# Patient Record
Sex: FEMALE | Race: WHITE | Employment: FULL TIME | URBAN - METROPOLITAN AREA
[De-identification: names, ages, dates, MRNs, and addresses within clinical notes are randomized per-mention and may not be internally consistent; named-entity substitution may affect disease eponyms.]

---

## 2017-01-13 ENCOUNTER — OFFICE VISIT (OUTPATIENT)
Dept: INTERNAL MEDICINE CLINIC | Facility: CLINIC | Age: 27
End: 2017-01-13

## 2017-01-13 VITALS
SYSTOLIC BLOOD PRESSURE: 110 MMHG | HEART RATE: 87 BPM | RESPIRATION RATE: 14 BRPM | WEIGHT: 127 LBS | OXYGEN SATURATION: 99 % | BODY MASS INDEX: 22.5 KG/M2 | TEMPERATURE: 99 F | DIASTOLIC BLOOD PRESSURE: 70 MMHG | HEIGHT: 62.9 IN

## 2017-01-13 DIAGNOSIS — R68.83 CHILLS: ICD-10-CM

## 2017-01-13 DIAGNOSIS — J02.9 SORE THROAT: Primary | ICD-10-CM

## 2017-01-13 LAB
CONTROL LINE PRESENT WITH A CLEAR BACKGROUND (YES/NO): PRESENT YES/NO
KIT EXPIRATION DATE: NORMAL DATE
STREP GRP A CUL-SCR: NEGATIVE

## 2017-01-13 PROCEDURE — 99213 OFFICE O/P EST LOW 20 MIN: CPT | Performed by: INTERNAL MEDICINE

## 2017-01-13 PROCEDURE — 87880 STREP A ASSAY W/OPTIC: CPT | Performed by: INTERNAL MEDICINE

## 2017-01-13 RX ORDER — AMOXICILLIN 500 MG/1
500 CAPSULE ORAL 3 TIMES DAILY
Qty: 30 CAPSULE | Refills: 0 | Status: SHIPPED | OUTPATIENT
Start: 2017-01-13 | End: 2017-01-23

## 2017-01-13 NOTE — PROGRESS NOTES
Sukhwinder Dimas is a 31year old female.     Chief complaint: sore throat and chills    HPI:     31year old female with PMH as listed below here for sore throat and chills  Sore throat started 1 week   No runny nose no sob no chest pain   No ear isreal tenderness  EXTREMITIES: no cyanosis, clubbing or edema  NEURO: no gross deficits         No orders of the defined types were placed in this encounter. Orders Placed This Encounter  POC Rapid Strep [[id number]]    ASSESSMENT AND PLAN:     1.  Sore throat

## 2017-02-02 ENCOUNTER — HOSPITAL ENCOUNTER (OUTPATIENT)
Age: 27
Discharge: HOME | End: 2017-02-02
Payer: COMMERCIAL

## 2017-02-02 VITALS
RESPIRATION RATE: 18 BRPM | BODY MASS INDEX: 22.15 KG/M2 | WEIGHT: 125 LBS | SYSTOLIC BLOOD PRESSURE: 112 MMHG | DIASTOLIC BLOOD PRESSURE: 65 MMHG | HEART RATE: 82 BPM | TEMPERATURE: 98 F | HEIGHT: 63 IN

## 2017-02-02 DIAGNOSIS — H61.22 IMPACTED CERUMEN OF LEFT EAR: ICD-10-CM

## 2017-02-02 DIAGNOSIS — N30.00 ACUTE CYSTITIS WITHOUT HEMATURIA: Primary | ICD-10-CM

## 2017-02-02 LAB
B-HCG UR QL: NEGATIVE
URINE BILIRUBIN: NEGATIVE
URINE BLOOD: NEGATIVE
URINE GLUCOSE: NEGATIVE MG/DL
URINE KETONES: NEGATIVE MG/DL
URINE NITRITE: NEGATIVE
URINE PH: 6
URINE SPECIFIC GRAVITY: 1.02
URINE UROBILINOGEN: 0.2 MG/DL

## 2017-02-02 PROCEDURE — 81002 URINALYSIS NONAUTO W/O SCOPE: CPT

## 2017-02-02 PROCEDURE — 99204 OFFICE O/P NEW MOD 45 MIN: CPT

## 2017-02-02 PROCEDURE — 81025 URINE PREGNANCY TEST: CPT

## 2017-02-02 PROCEDURE — 69209 REMOVE IMPACTED EAR WAX UNI: CPT

## 2017-02-02 PROCEDURE — 99213 OFFICE O/P EST LOW 20 MIN: CPT

## 2017-02-02 RX ORDER — NITROFURANTOIN 25; 75 MG/1; MG/1
100 CAPSULE ORAL 2 TIMES DAILY
Qty: 14 CAPSULE | Refills: 0 | Status: SHIPPED | OUTPATIENT
Start: 2017-02-02 | End: 2017-02-09

## 2017-02-03 NOTE — ED PROVIDER NOTES
Patient presents with:  Urinary Symptoms (urologic)      HPI:     Gayatri Monet is a 31year old female who presents with a chief complaint of urinary frequency and urgency that started a few days ago.   The patient states she has some left lower quad Date)  GENERAL: well developed, well nourished, well hydrated, no distress  SKIN: good skin turgor, no rashes  HEENT:normocephalic, Right TM clear without signs of infection. Left TM-cerumen present. Minimal visualization of the TM.  No mastoid redness, isreal follow-up. She is aware that if her left lower quadrant pain does not get better or becomes worse, she should go to the emergency department for further evaluation. The left ear was irrigated. The cerumen was removed.   Post removal, the ear was visualiz

## 2017-02-21 ENCOUNTER — OFFICE VISIT (OUTPATIENT)
Dept: INTERNAL MEDICINE CLINIC | Facility: CLINIC | Age: 27
End: 2017-02-21

## 2017-02-21 VITALS
SYSTOLIC BLOOD PRESSURE: 110 MMHG | RESPIRATION RATE: 19 BRPM | HEIGHT: 62.9 IN | OXYGEN SATURATION: 97 % | HEART RATE: 69 BPM | BODY MASS INDEX: 22.5 KG/M2 | DIASTOLIC BLOOD PRESSURE: 60 MMHG | WEIGHT: 127 LBS | TEMPERATURE: 98 F

## 2017-02-21 DIAGNOSIS — E55.9 VITAMIN D DEFICIENCY: ICD-10-CM

## 2017-02-21 DIAGNOSIS — R35.0 URINARY FREQUENCY: Primary | ICD-10-CM

## 2017-02-21 LAB
APPEARANCE: CLEAR
BILIRUBIN: NEGATIVE
GLUCOSE (URINE DIPSTICK): NEGATIVE MG/DL
KETONES (URINE DIPSTICK): NEGATIVE MG/DL
LEUKOCYTES: NEGATIVE
MULTISTIX LOT#: NORMAL NUMERIC
NITRITE, URINE: NEGATIVE
PH, URINE: 5.5 (ref 4.5–8)
PROTEIN (URINE DIPSTICK): NEGATIVE MG/DL
SPECIFIC GRAVITY: 1.03 (ref 1–1.03)
URINE-COLOR: YELLOW
UROBILINOGEN,SEMI-QN: 0.2 MG/DL (ref 0–1.9)

## 2017-02-21 PROCEDURE — 99213 OFFICE O/P EST LOW 20 MIN: CPT | Performed by: FAMILY MEDICINE

## 2017-02-21 PROCEDURE — 81003 URINALYSIS AUTO W/O SCOPE: CPT | Performed by: FAMILY MEDICINE

## 2017-02-21 RX ORDER — NITROFURANTOIN 25; 75 MG/1; MG/1
CAPSULE ORAL
Refills: 0 | COMMUNITY
Start: 2017-02-02 | End: 2017-12-29 | Stop reason: ALTCHOICE

## 2017-02-21 RX ORDER — AMOXICILLIN 500 MG/1
CAPSULE ORAL
Refills: 0 | COMMUNITY
Start: 2017-01-13 | End: 2017-02-21 | Stop reason: ALTCHOICE

## 2017-02-21 RX ORDER — ERGOCALCIFEROL 1.25 MG/1
50000 CAPSULE ORAL WEEKLY
Qty: 12 CAPSULE | Refills: 0 | Status: SHIPPED | OUTPATIENT
Start: 2017-02-21 | End: 2017-03-23

## 2017-02-21 RX ORDER — ERGOCALCIFEROL 1.25 MG/1
CAPSULE ORAL
Refills: 3 | COMMUNITY
Start: 2016-12-30 | End: 2017-12-29 | Stop reason: ALTCHOICE

## 2017-02-22 NOTE — PROGRESS NOTES
CC:  Lab Results and UTI      Hx of CC:  URINARY FREQUENCY OVER PAST SEVERAL DAYS, NO DYSURIA. HAD MENSES AROUND 2/15. IMPROVED ENERGY POST VITAMIN D X 1 MONTH BUT RECURRENT FATIGUE.     Vitals:    02/21/17  1823   BP: 110/60   Pulse: 69   Temp: 98 °F (

## 2017-12-22 ENCOUNTER — APPOINTMENT (OUTPATIENT)
Dept: GENERAL RADIOLOGY | Age: 27
End: 2017-12-22
Attending: EMERGENCY MEDICINE
Payer: COMMERCIAL

## 2017-12-22 ENCOUNTER — HOSPITAL ENCOUNTER (OUTPATIENT)
Age: 27
Discharge: HOME | End: 2017-12-22
Attending: EMERGENCY MEDICINE
Payer: COMMERCIAL

## 2017-12-22 VITALS
WEIGHT: 135 LBS | TEMPERATURE: 99 F | HEIGHT: 62 IN | OXYGEN SATURATION: 100 % | BODY MASS INDEX: 24.84 KG/M2 | HEART RATE: 72 BPM | DIASTOLIC BLOOD PRESSURE: 72 MMHG | RESPIRATION RATE: 18 BRPM | SYSTOLIC BLOOD PRESSURE: 115 MMHG

## 2017-12-22 DIAGNOSIS — R07.9 ACUTE CHEST PAIN: Primary | ICD-10-CM

## 2017-12-22 PROCEDURE — 99213 OFFICE O/P EST LOW 20 MIN: CPT

## 2017-12-22 PROCEDURE — 71101 X-RAY EXAM UNILAT RIBS/CHEST: CPT | Performed by: EMERGENCY MEDICINE

## 2017-12-22 NOTE — ED INITIAL ASSESSMENT (HPI)
r sided rib pain on and off for 2 weeks, + sob, denies leg pain or swelling, denies trauma or injury

## 2017-12-22 NOTE — ED PROVIDER NOTES
Patient Seen in: [facility]    History   Patient presents with:  Trauma (cardiovascular, musculoskeletal)    Stated Complaint: RT. SIDE RIBS PAIN    HPI    This patient complains of right sided pain in her chest for the l (Oral)   Resp 18   Ht 157.5 cm (5' 2\")   Wt 61.2 kg   LMP 12/01/2017 (Exact Date)   SpO2 100%   BMI 24.69 kg/m²         Physical Exam    Patient is awake and alert nontoxic in appearance  Eyes pupils are equal and reactive  ENT there are moist mucous memb symptoms do not improve

## 2017-12-29 ENCOUNTER — OFFICE VISIT (OUTPATIENT)
Dept: INTERNAL MEDICINE CLINIC | Facility: CLINIC | Age: 27
End: 2017-12-29

## 2017-12-29 VITALS
HEART RATE: 89 BPM | DIASTOLIC BLOOD PRESSURE: 62 MMHG | RESPIRATION RATE: 17 BRPM | BODY MASS INDEX: 27 KG/M2 | WEIGHT: 150 LBS | OXYGEN SATURATION: 100 % | SYSTOLIC BLOOD PRESSURE: 102 MMHG | TEMPERATURE: 98 F

## 2017-12-29 DIAGNOSIS — R07.81 RIB PAIN ON LEFT SIDE: Primary | ICD-10-CM

## 2017-12-29 PROCEDURE — 81000 URINALYSIS NONAUTO W/SCOPE: CPT | Performed by: INTERNAL MEDICINE

## 2017-12-29 PROCEDURE — 81025 URINE PREGNANCY TEST: CPT | Performed by: INTERNAL MEDICINE

## 2017-12-29 PROCEDURE — 99213 OFFICE O/P EST LOW 20 MIN: CPT | Performed by: INTERNAL MEDICINE

## 2017-12-29 RX ORDER — ARIPIPRAZOLE 2 MG/1
2 TABLET ORAL DAILY
COMMUNITY
End: 2018-06-06 | Stop reason: ALTCHOICE

## 2017-12-29 RX ORDER — RANITIDINE 150 MG/1
150 TABLET ORAL 2 TIMES DAILY
Qty: 40 TABLET | Refills: 0 | Status: SHIPPED | OUTPATIENT
Start: 2017-12-29 | End: 2018-06-06 | Stop reason: ALTCHOICE

## 2017-12-29 NOTE — PROGRESS NOTES
HPI:    Patient ID: Ranjit Grvaes is a 28year old female. HPI   Her for f/u after visit to . Patient had right lower rib discomfort for 3 weeks. She described it as if there are \" bubbles\"  around the area. Pain is 2/10.  Denies cough, fev no guarding. Musculoskeletal: She exhibits no edema. Skin: No rash noted. No erythema.      UCG negative  UA dipstick neg       ASSESSMENT/PLAN:   Rib pain on left side  (primary encounter diagnosis)    Rib x ray showed no fracture  BODØ is a teache

## 2018-06-06 ENCOUNTER — OFFICE VISIT (OUTPATIENT)
Dept: INTERNAL MEDICINE CLINIC | Facility: CLINIC | Age: 28
End: 2018-06-06

## 2018-06-06 VITALS
TEMPERATURE: 100 F | WEIGHT: 150.38 LBS | HEART RATE: 65 BPM | DIASTOLIC BLOOD PRESSURE: 60 MMHG | OXYGEN SATURATION: 99 % | HEIGHT: 62.9 IN | BODY MASS INDEX: 26.64 KG/M2 | SYSTOLIC BLOOD PRESSURE: 104 MMHG | RESPIRATION RATE: 17 BRPM

## 2018-06-06 DIAGNOSIS — L65.9 HAIR LOSS: ICD-10-CM

## 2018-06-06 DIAGNOSIS — R10.2 PELVIC PAIN: Primary | ICD-10-CM

## 2018-06-06 DIAGNOSIS — N92.0 MENORRHAGIA WITH REGULAR CYCLE: ICD-10-CM

## 2018-06-06 PROCEDURE — 99213 OFFICE O/P EST LOW 20 MIN: CPT | Performed by: INTERNAL MEDICINE

## 2018-06-06 RX ORDER — NORGESTIMATE AND ETHINYL ESTRADIOL 0.25-0.035
1 KIT ORAL DAILY
Qty: 1 PACKAGE | Refills: 11 | Status: SHIPPED | OUTPATIENT
Start: 2018-06-06 | End: 2019-02-13

## 2018-06-06 NOTE — PROGRESS NOTES
HPI:    Patient ID: Cj Goodwin is a 33year old female. Patient here to address ongoing issues of left lower quadrant paion heightened by the time of menses- had a normal gyne exam ands pap smear a few weeks ago.    2yrs ago had a pelvic US whic

## 2018-07-22 ENCOUNTER — HOSPITAL ENCOUNTER (OUTPATIENT)
Age: 28
Discharge: HOME | End: 2018-07-22
Payer: COMMERCIAL

## 2018-07-22 VITALS
WEIGHT: 150 LBS | SYSTOLIC BLOOD PRESSURE: 124 MMHG | TEMPERATURE: 98 F | HEIGHT: 63 IN | BODY MASS INDEX: 26.58 KG/M2 | DIASTOLIC BLOOD PRESSURE: 80 MMHG | HEART RATE: 74 BPM | RESPIRATION RATE: 18 BRPM | OXYGEN SATURATION: 100 %

## 2018-07-22 DIAGNOSIS — N30.90 CYSTITIS: Primary | ICD-10-CM

## 2018-07-22 LAB
B-HCG UR QL: NEGATIVE
BILIRUB UR QL STRIP: NEGATIVE
CLARITY UR: CLEAR
COLOR UR: YELLOW
GLUCOSE UR STRIP-MCNC: NEGATIVE MG/DL
HGB UR QL STRIP: NEGATIVE
KETONES UR STRIP-MCNC: NEGATIVE MG/DL
NITRITE UR QL STRIP: NEGATIVE
PH UR STRIP: 5.5 [PH]
PROT UR STRIP-MCNC: NEGATIVE MG/DL
SP GR UR STRIP: 1.02
UROBILINOGEN UR STRIP-ACNC: <2 MG/DL

## 2018-07-22 PROCEDURE — 81003 URINALYSIS AUTO W/O SCOPE: CPT

## 2018-07-22 PROCEDURE — 99213 OFFICE O/P EST LOW 20 MIN: CPT

## 2018-07-22 PROCEDURE — 99214 OFFICE O/P EST MOD 30 MIN: CPT

## 2018-07-22 PROCEDURE — 81025 URINE PREGNANCY TEST: CPT

## 2018-07-22 RX ORDER — NITROFURANTOIN 25; 75 MG/1; MG/1
100 CAPSULE ORAL 2 TIMES DAILY
Qty: 14 CAPSULE | Refills: 0 | Status: SHIPPED | OUTPATIENT
Start: 2018-07-22 | End: 2018-07-29

## 2018-07-22 NOTE — ED INITIAL ASSESSMENT (HPI)
Patient reports 1 week history of urinary frequency and dysuria.  + chills over the \"last few days. \"  Denies flank/lower back pain.

## 2018-07-22 NOTE — ED PROVIDER NOTES
Patient presents with:  Urinary Symptoms (urologic)      HPI:     Gene Wen is a 33year old female who presents with a chief complaint of dysuria, urgency, and frequency for the past couple days.   She has had a urinary tract infection in the pas RRW  CARDIO: RRR without murmur  EXTREMITIES: no cyanosis or edema. PAREKH without difficulty  BACK: CVA tenderness: Bilaterally, No  GI: soft, non-tender, without masses or organomegaly  NEURO: No deficits.       MDM/Assessment/Plan:   Orders for this encount

## 2018-07-23 ENCOUNTER — APPOINTMENT (OUTPATIENT)
Dept: GENERAL RADIOLOGY | Facility: HOSPITAL | Age: 28
End: 2018-07-23
Attending: EMERGENCY MEDICINE
Payer: COMMERCIAL

## 2018-07-23 ENCOUNTER — HOSPITAL ENCOUNTER (EMERGENCY)
Facility: HOSPITAL | Age: 28
Discharge: HOME | End: 2018-07-24
Attending: EMERGENCY MEDICINE
Payer: COMMERCIAL

## 2018-07-23 VITALS
SYSTOLIC BLOOD PRESSURE: 129 MMHG | DIASTOLIC BLOOD PRESSURE: 84 MMHG | BODY MASS INDEX: 27 KG/M2 | HEART RATE: 103 BPM | RESPIRATION RATE: 18 BRPM | TEMPERATURE: 99 F | OXYGEN SATURATION: 99 % | WEIGHT: 149.94 LBS

## 2018-07-23 DIAGNOSIS — T50.905A ADVERSE EFFECT OF DRUG, INITIAL ENCOUNTER: Primary | ICD-10-CM

## 2018-07-23 LAB
ANION GAP SERPL CALC-SCNC: 8 MMOL/L (ref 0–18)
B-HCG UR QL: NEGATIVE
BASOPHILS # BLD: 0 K/UL (ref 0–0.2)
BASOPHILS NFR BLD: 0 %
BILIRUB UR QL: NEGATIVE
BUN SERPL-MCNC: 12 MG/DL (ref 8–20)
BUN/CREAT SERPL: 15.2 (ref 10–20)
CALCIUM SERPL-MCNC: 8.9 MG/DL (ref 8.5–10.5)
CHLORIDE SERPL-SCNC: 104 MMOL/L (ref 95–110)
CO2 SERPL-SCNC: 23 MMOL/L (ref 22–32)
COLOR UR: YELLOW
CREAT SERPL-MCNC: 0.79 MG/DL (ref 0.5–1.5)
EOSINOPHIL # BLD: 0.4 K/UL (ref 0–0.7)
EOSINOPHIL NFR BLD: 3 %
ERYTHROCYTE [DISTWIDTH] IN BLOOD BY AUTOMATED COUNT: 12.8 % (ref 11–15)
GLUCOSE SERPL-MCNC: 102 MG/DL (ref 70–99)
GLUCOSE UR-MCNC: NEGATIVE MG/DL
HCT VFR BLD AUTO: 41.8 % (ref 35–48)
HGB BLD-MCNC: 14 G/DL (ref 12–16)
HGB UR QL STRIP.AUTO: NEGATIVE
KETONES UR-MCNC: NEGATIVE MG/DL
LYMPHOCYTES # BLD: 0.9 K/UL (ref 1–4)
LYMPHOCYTES NFR BLD: 7 %
MCH RBC QN AUTO: 29.5 PG (ref 27–32)
MCHC RBC AUTO-ENTMCNC: 33.5 G/DL (ref 32–37)
MCV RBC AUTO: 88 FL (ref 80–100)
MONOCYTES # BLD: 0.6 K/UL (ref 0–1)
MONOCYTES NFR BLD: 5 %
NEUTROPHILS # BLD AUTO: 10.5 K/UL (ref 1.8–7.7)
NEUTROPHILS NFR BLD: 85 %
NITRITE UR QL STRIP.AUTO: NEGATIVE
OSMOLALITY UR CALC.SUM OF ELEC: 280 MOSM/KG (ref 275–295)
PH UR: 5 [PH] (ref 5–8)
PLATELET # BLD AUTO: 224 K/UL (ref 140–400)
PMV BLD AUTO: 6.3 FL (ref 7.4–10.3)
POTASSIUM SERPL-SCNC: 3.5 MMOL/L (ref 3.3–5.1)
PROT UR-MCNC: NEGATIVE MG/DL
RBC # BLD AUTO: 4.75 M/UL (ref 3.7–5.4)
RBC #/AREA URNS AUTO: 2 /HPF
SODIUM SERPL-SCNC: 135 MMOL/L (ref 136–144)
SP GR UR STRIP: 1.02 (ref 1–1.03)
UROBILINOGEN UR STRIP-ACNC: <2
VIT C UR-MCNC: NEGATIVE MG/DL
WBC # BLD AUTO: 12.4 K/UL (ref 4–11)
WBC #/AREA URNS AUTO: 3 /HPF

## 2018-07-23 PROCEDURE — 82550 ASSAY OF CK (CPK): CPT | Performed by: EMERGENCY MEDICINE

## 2018-07-23 PROCEDURE — 36415 COLL VENOUS BLD VENIPUNCTURE: CPT

## 2018-07-23 PROCEDURE — 85025 COMPLETE CBC W/AUTO DIFF WBC: CPT | Performed by: EMERGENCY MEDICINE

## 2018-07-23 PROCEDURE — 99284 EMERGENCY DEPT VISIT MOD MDM: CPT

## 2018-07-23 PROCEDURE — 81001 URINALYSIS AUTO W/SCOPE: CPT | Performed by: EMERGENCY MEDICINE

## 2018-07-23 PROCEDURE — 80048 BASIC METABOLIC PNL TOTAL CA: CPT | Performed by: EMERGENCY MEDICINE

## 2018-07-23 PROCEDURE — 81025 URINE PREGNANCY TEST: CPT

## 2018-07-23 PROCEDURE — 71046 X-RAY EXAM CHEST 2 VIEWS: CPT | Performed by: EMERGENCY MEDICINE

## 2018-07-23 RX ORDER — CIPROFLOXACIN 250 MG/1
250 TABLET, FILM COATED ORAL 2 TIMES DAILY
Qty: 10 TABLET | Refills: 0 | Status: SHIPPED | OUTPATIENT
Start: 2018-07-23 | End: 2018-07-30 | Stop reason: ALTCHOICE

## 2018-07-24 LAB — CK SERPL-CCNC: 38 U/L (ref 38–234)

## 2018-07-24 NOTE — ED PROVIDER NOTES
Patient Seen in: [facility] AND [facility] Emergency Department    History   Patient presents with:  Leg Pain  Shortness Of Breath  Cough/URI    Stated Complaint: shortness of breath     HPI  Patient is a 23-year-old female who was seen in immediate care yesterd soft, nontender, no distension  Back: No cvat  Extremities: FROM of all extremities, no cyanosis/clubbing/edema, compartments soft  Neuro: CN intact, normal speech, normal gait, 5/5 motor strength in all extremities, no focal deficits  SKIN: warm, dry, no of UTI although urine with only trace LE. Will send for culture and advised continuing antibiotic with close f/u with PCP. Pt inquiring as to if she has vitamin deficiency causing her leg pains.  Advised her that test is not run from ED and if this is morales

## 2018-07-24 NOTE — ED INITIAL ASSESSMENT (HPI)
Dry cough began today and feeling SOB. Pt also c/o left thigh pain and body aches. Pt started on Macrobid but changed to [address] today by PCP.

## 2018-07-30 ENCOUNTER — OFFICE VISIT (OUTPATIENT)
Dept: INTERNAL MEDICINE CLINIC | Facility: CLINIC | Age: 28
End: 2018-07-30
Payer: COMMERCIAL

## 2018-07-30 VITALS
BODY MASS INDEX: 26.58 KG/M2 | HEIGHT: 62.9 IN | SYSTOLIC BLOOD PRESSURE: 110 MMHG | TEMPERATURE: 99 F | DIASTOLIC BLOOD PRESSURE: 60 MMHG | WEIGHT: 150 LBS | OXYGEN SATURATION: 98 % | RESPIRATION RATE: 16 BRPM | HEART RATE: 74 BPM

## 2018-07-30 DIAGNOSIS — R10.32 INTERMITTENT LEFT LOWER QUADRANT ABDOMINAL PAIN: Primary | ICD-10-CM

## 2018-07-30 PROCEDURE — 99213 OFFICE O/P EST LOW 20 MIN: CPT | Performed by: INTERNAL MEDICINE

## 2018-07-30 NOTE — PROGRESS NOTES
HPI:    Patient ID: Ever Boles is a 33year old female. Pt here for fu from ER evaluation for left lower quadrant pain with some radiation to the anterior thigh. Had a recent UTI rxed with Cipro.    Pain is enough to warrant continous  ADvil rx

## 2018-08-01 ENCOUNTER — HOSPITAL ENCOUNTER (OUTPATIENT)
Dept: ULTRASOUND IMAGING | Age: 28
Discharge: HOME | End: 2018-08-01
Attending: INTERNAL MEDICINE
Payer: COMMERCIAL

## 2018-08-01 DIAGNOSIS — R10.32 INTERMITTENT LEFT LOWER QUADRANT ABDOMINAL PAIN: ICD-10-CM

## 2018-08-01 PROCEDURE — 76830 TRANSVAGINAL US NON-OB: CPT | Performed by: INTERNAL MEDICINE

## 2018-08-01 PROCEDURE — 76856 US EXAM PELVIC COMPLETE: CPT | Performed by: INTERNAL MEDICINE

## 2018-08-02 ENCOUNTER — TELEPHONE (OUTPATIENT)
Dept: INTERNAL MEDICINE CLINIC | Facility: CLINIC | Age: 28
End: 2018-08-02

## 2018-08-02 NOTE — TELEPHONE ENCOUNTER
Pt believes she pulled a hamstring muscle stated that she was given muscle relaxer in the past. Pt denied any SOB, chest pain or dizziness. Tried to schedule pt to come in to office for evaluation. She refused.

## 2018-09-14 ENCOUNTER — TELEPHONE (OUTPATIENT)
Dept: INTERNAL MEDICINE CLINIC | Facility: CLINIC | Age: 28
End: 2018-09-14

## 2018-09-14 RX ORDER — DICYCLOMINE HYDROCHLORIDE 10 MG/1
10 CAPSULE ORAL 4 TIMES DAILY
Qty: 20 CAPSULE | Refills: 1 | Status: SHIPPED | OUTPATIENT
Start: 2018-09-14 | End: 2018-09-24

## 2018-09-14 NOTE — TELEPHONE ENCOUNTER
Pt wanted to be seen today for stomach pain, informed her Dr Rachel Martin is booked today.  Pt insists to have Dr. Rachel Martin call her told her we are not sure what time she would be available to call her since she is seen pts, also informed her she would need to go

## 2018-09-17 ENCOUNTER — TELEPHONE (OUTPATIENT)
Dept: GASTROENTEROLOGY | Facility: CLINIC | Age: 28
End: 2018-09-17

## 2018-09-17 NOTE — TELEPHONE ENCOUNTER
Dr Timbo Ramsey is wondering if you can see this pt ASAP. She is experiencing a lot of abdominal pain.      See telephone encounter from 09/14/18 and pt Wirescan messages to Dr Timbo Ramsey    Please advise if you can see this pt or if Thelma Dotson can see her      thanks

## 2018-09-17 NOTE — TELEPHONE ENCOUNTER
I can see tomorrow morning at the [facility], sometime before 11 AM.  Please notify patient as to location.

## 2018-09-17 NOTE — TELEPHONE ENCOUNTER
Patient called back I made appt for her tomorrow as Dr. Ana Laura Dale requested for 9/18/18 @ 10AM  Future Appointments   Date Time Provider Lyndsey Michelle   9/18/2018 10:00 AM MD Gerardo [address] - [place]

## 2018-09-18 ENCOUNTER — OFFICE VISIT (OUTPATIENT)
Dept: GASTROENTEROLOGY | Facility: CLINIC | Age: 28
End: 2018-09-18
Payer: COMMERCIAL

## 2018-09-18 ENCOUNTER — TELEPHONE (OUTPATIENT)
Dept: GASTROENTEROLOGY | Facility: CLINIC | Age: 28
End: 2018-09-18

## 2018-09-18 VITALS
WEIGHT: 154 LBS | DIASTOLIC BLOOD PRESSURE: 74 MMHG | HEART RATE: 75 BPM | BODY MASS INDEX: 26.95 KG/M2 | HEIGHT: 63.5 IN | SYSTOLIC BLOOD PRESSURE: 109 MMHG

## 2018-09-18 DIAGNOSIS — R10.32 INTERMITTENT LEFT LOWER QUADRANT ABDOMINAL PAIN: Primary | ICD-10-CM

## 2018-09-18 DIAGNOSIS — K58.1 IRRITABLE BOWEL SYNDROME WITH CONSTIPATION: ICD-10-CM

## 2018-09-18 PROCEDURE — 99212 OFFICE O/P EST SF 10 MIN: CPT | Performed by: INTERNAL MEDICINE

## 2018-09-18 PROCEDURE — 99244 OFF/OP CNSLTJ NEW/EST MOD 40: CPT | Performed by: INTERNAL MEDICINE

## 2018-09-18 NOTE — TELEPHONE ENCOUNTER
Scheduled for:  Colonoscopy 46742  Provider Name:   Date:  10/19/18  Location:  [facility]  Sedation: Ivcs   Time: 0830 / Arrival 0730   Prep: Split dose Miralax +Gatorade + 2 tabs of Dulcolax   Meds/Allergies Reconciled?:  Physician reviewed  Diagnosis wit

## 2018-09-18 NOTE — PATIENT INSTRUCTIONS
1.  Schedule CT scan of the abdomen and pelvis with IV and oral contrast    2. Schedule colonoscopy with split dose MiraLAX preparation and MAC at [facility] or IV sedation at [facility] to be timed after CT scan. 3.  Continue dicyclomine as needed.

## 2018-09-18 NOTE — PROGRESS NOTES
HPI:    Patient ID: New Chiang is a 33year old female. History of Present Illness: This is a 30-year-old female patient of Dr. Nabila Corona referred for abdominal pain.   The patient describes intermittent left lower quadrant pain which occurs in record    Current medications: Dicyclomine as needed, and improved with this somewhat, sertraline, birth control pills not taking    Social history: Non-smoker, rare alcohol, some caffeine.   Independent in her activities of daily living    Family history: congestion, ear pain, sore throat and trouble swallowing. Eyes: Negative for pain. Respiratory: Negative for cough, choking, chest tightness, shortness of breath, wheezing and stridor.     Cardiovascular: Negative for chest pain, palpitations and leg s discharge. No scleral icterus. Neck: Normal range of motion. Neck supple. No JVD present. No tracheal deviation present. No thyromegaly present. Cardiovascular: Normal rate, regular rhythm and normal heart sounds. No murmur heard.   Pulmonary/Chest: E

## 2018-09-25 ENCOUNTER — TELEPHONE (OUTPATIENT)
Dept: GASTROENTEROLOGY | Facility: CLINIC | Age: 28
End: 2018-09-25

## 2018-09-25 DIAGNOSIS — R10.32 LEFT LOWER QUADRANT PAIN: Primary | ICD-10-CM

## 2018-09-25 NOTE — TELEPHONE ENCOUNTER
I spoke to the pt. She was advised that Dr Jacqueline Haro ordered the CT scan. She needs to wait to here from our managed care department that the procedure has been approved before scheduling.  I gave her the number for central scheduling for when she is ready to sc

## 2018-09-25 NOTE — TELEPHONE ENCOUNTER
Dr Zuleika Pineda,    The pt has a colonoscopy scheduled at the end of November. Do you want to order a CT scan for prior to the procedure? Please advise      Instructions     1. Schedule CT scan of the abdomen and pelvis with IV and oral contrast     2.   David

## 2018-09-25 NOTE — TELEPHONE ENCOUNTER
Yes CT scan  abdomen and pelvis with IV and oral contrast should be done. I have ordered it. Thank you.

## 2018-09-25 NOTE — TELEPHONE ENCOUNTER
Pt states that Dr. Marquis Guzman wanted her to have CT done before she has colonoscopy. Has this been ordered and also authorized? Please call.

## 2018-10-03 ENCOUNTER — HOSPITAL ENCOUNTER (OUTPATIENT)
Dept: CT IMAGING | Facility: HOSPITAL | Age: 28
Discharge: HOME | End: 2018-10-03
Attending: INTERNAL MEDICINE
Payer: COMMERCIAL

## 2018-10-03 DIAGNOSIS — R10.32 LEFT LOWER QUADRANT PAIN: ICD-10-CM

## 2018-10-03 PROCEDURE — 74177 CT ABD & PELVIS W/CONTRAST: CPT | Performed by: INTERNAL MEDICINE

## 2018-10-03 PROCEDURE — 82565 ASSAY OF CREATININE: CPT

## 2018-10-09 ENCOUNTER — TELEPHONE (OUTPATIENT)
Dept: GASTROENTEROLOGY | Facility: CLINIC | Age: 28
End: 2018-10-09

## 2018-10-09 DIAGNOSIS — R10.32 INTERMITTENT LEFT LOWER QUADRANT ABDOMINAL PAIN: Primary | ICD-10-CM

## 2018-10-09 NOTE — TELEPHONE ENCOUNTER
Pt calling for results from CT ABDOMEN+PELVIS completed on 10/03/18.  Please call thank you [phone]

## 2018-10-11 NOTE — TELEPHONE ENCOUNTER
Pt called again about results. Aware EMBS out of office but she states that she is scheduled for colonoscopy on 10/19/18 and would like to know if she still needs to have procedure based on  CT results. Please call.

## 2018-10-11 NOTE — TELEPHONE ENCOUNTER
I spoke with the patient. Her left lower quadrant abdominal pain is resolved on dicyclomine. Her constipation is also resolved with conservative management. I reviewed the CT scan with her. She wants to cancel the colonoscopy.   I agree that the [email or web address]

## 2018-12-09 ENCOUNTER — HOSPITAL ENCOUNTER (OUTPATIENT)
Age: 28
Discharge: HOME | End: 2018-12-09
Attending: EMERGENCY MEDICINE
Payer: COMMERCIAL

## 2018-12-09 VITALS
HEART RATE: 80 BPM | OXYGEN SATURATION: 100 % | SYSTOLIC BLOOD PRESSURE: 114 MMHG | TEMPERATURE: 98 F | BODY MASS INDEX: 24.8 KG/M2 | RESPIRATION RATE: 16 BRPM | WEIGHT: 140 LBS | DIASTOLIC BLOOD PRESSURE: 66 MMHG | HEIGHT: 63 IN

## 2018-12-09 DIAGNOSIS — B96.89 ACUTE BACTERIAL PHARYNGITIS: Primary | ICD-10-CM

## 2018-12-09 DIAGNOSIS — J02.8 ACUTE BACTERIAL PHARYNGITIS: Primary | ICD-10-CM

## 2018-12-09 PROCEDURE — 99214 OFFICE O/P EST MOD 30 MIN: CPT

## 2018-12-09 PROCEDURE — 87430 STREP A AG IA: CPT

## 2018-12-09 PROCEDURE — 99213 OFFICE O/P EST LOW 20 MIN: CPT

## 2018-12-09 RX ORDER — CEFDINIR 300 MG/1
300 CAPSULE ORAL 2 TIMES DAILY
Qty: 14 CAPSULE | Refills: 0 | Status: SHIPPED | OUTPATIENT
Start: 2018-12-09 | End: 2018-12-16

## 2018-12-09 NOTE — ED PROVIDER NOTES
Patient Seen in: [facility] In [place]      Stated Complaint: Sore throat    HPI    The patient complains of sore throat which she has had for 3 weeks. The patient denies fever or chills or cough. She has had right-sided ear pain. tenderness on palpation of the right submandibular area no overlying fluctuance or induration  Lungs are clear to auscultation  Extremities there is no edema   Neurologic there are no gross cranial nerve deficits patient moves all 4 extremities without imp

## 2018-12-09 NOTE — ED INITIAL ASSESSMENT (HPI)
REPORTS SORE THROAT X 3 WEEKS. DENIES Danis Banks. STATES SHE FEELS TIRED. C/O RIGHT SIDED EAR PAIN AS WELL.

## 2019-02-13 ENCOUNTER — OFFICE VISIT (OUTPATIENT)
Dept: INTERNAL MEDICINE CLINIC | Facility: CLINIC | Age: 29
End: 2019-02-13
Payer: COMMERCIAL

## 2019-02-13 VITALS
DIASTOLIC BLOOD PRESSURE: 88 MMHG | HEIGHT: 63 IN | WEIGHT: 159 LBS | BODY MASS INDEX: 28.17 KG/M2 | SYSTOLIC BLOOD PRESSURE: 118 MMHG

## 2019-02-13 DIAGNOSIS — R53.83 OTHER FATIGUE: Primary | ICD-10-CM

## 2019-02-13 DIAGNOSIS — G93.3 POST VIRAL SYNDROME: ICD-10-CM

## 2019-02-13 LAB
BASOPHILS # BLD AUTO: 0.05 X10(3) UL (ref 0–0.2)
BASOPHILS NFR BLD AUTO: 0.5 %
DEPRECATED RDW RBC AUTO: 39.5 FL (ref 35.1–46.3)
EOSINOPHIL # BLD AUTO: 0.25 X10(3) UL (ref 0–0.7)
EOSINOPHIL NFR BLD AUTO: 2.6 %
ERYTHROCYTE [DISTWIDTH] IN BLOOD BY AUTOMATED COUNT: 12.7 % (ref 11–15)
HCT VFR BLD AUTO: 40.3 % (ref 35–48)
HGB BLD-MCNC: 13.5 G/DL (ref 12–16)
IMM GRANULOCYTES # BLD AUTO: 0.03 X10(3) UL (ref 0–1)
IMM GRANULOCYTES NFR BLD: 0.3 %
LYMPHOCYTES # BLD AUTO: 2.74 X10(3) UL (ref 1–4)
LYMPHOCYTES NFR BLD AUTO: 28.8 %
MCH RBC QN AUTO: 29 PG (ref 26–34)
MCHC RBC AUTO-ENTMCNC: 33.5 G/DL (ref 31–37)
MCV RBC AUTO: 86.5 FL (ref 80–100)
MONOCYTES # BLD AUTO: 0.5 X10(3) UL (ref 0.1–1)
MONOCYTES NFR BLD AUTO: 5.3 %
NEUTROPHILS # BLD AUTO: 5.95 X10 (3) UL (ref 1.5–7.7)
NEUTROPHILS # BLD AUTO: 5.95 X10(3) UL (ref 1.5–7.7)
NEUTROPHILS NFR BLD AUTO: 62.5 %
PLATELET # BLD AUTO: 262 10(3)UL (ref 150–450)
RBC # BLD AUTO: 4.66 X10(6)UL (ref 3.8–5.3)
TSI SER-ACNC: 0.97 MIU/ML (ref 0.36–3.74)
VIT B12 SERPL-MCNC: 571 PG/ML (ref 193–986)
WBC # BLD AUTO: 9.5 X10(3) UL (ref 4–11)

## 2019-02-13 PROCEDURE — 99213 OFFICE O/P EST LOW 20 MIN: CPT | Performed by: INTERNAL MEDICINE

## 2019-02-13 PROCEDURE — 82306 VITAMIN D 25 HYDROXY: CPT | Performed by: INTERNAL MEDICINE

## 2019-02-13 PROCEDURE — 82607 VITAMIN B-12: CPT | Performed by: INTERNAL MEDICINE

## 2019-02-13 PROCEDURE — 84443 ASSAY THYROID STIM HORMONE: CPT | Performed by: INTERNAL MEDICINE

## 2019-02-13 PROCEDURE — 85025 COMPLETE CBC W/AUTO DIFF WBC: CPT | Performed by: INTERNAL MEDICINE

## 2019-02-13 NOTE — PROGRESS NOTES
HPI:    Patient ID: Jaylon Tran is a 33year old female. Pt here for an evaluation of some fatigue and low energy level. Has been to urgent care 2 x for what appeared to be a viral illness. 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 .    Review of

## 2019-02-15 LAB — 25(OH)D3 SERPL-MCNC: 14.1 NG/ML (ref 30–100)

## 2019-03-21 ENCOUNTER — WALK IN (OUTPATIENT)
Dept: URGENT CARE | Age: 29
End: 2019-03-21

## 2019-03-21 DIAGNOSIS — J30.2 SEASONAL ALLERGIC RHINITIS, UNSPECIFIED TRIGGER: ICD-10-CM

## 2019-03-21 DIAGNOSIS — J02.9 SORE THROAT: Primary | ICD-10-CM

## 2019-03-21 LAB
INTERNAL PROCEDURAL CONTROLS ACCEPTABLE: YES
S PYO AG THROAT QL IA.RAPID: NEGATIVE

## 2019-03-21 PROCEDURE — 87880 STREP A ASSAY W/OPTIC: CPT | Performed by: NURSE PRACTITIONER

## 2019-03-21 PROCEDURE — 99213 OFFICE O/P EST LOW 20 MIN: CPT | Performed by: NURSE PRACTITIONER

## 2019-03-21 RX ORDER — SERTRALINE HYDROCHLORIDE 100 MG/1
TABLET, FILM COATED ORAL 2 TIMES DAILY
COMMUNITY

## 2019-03-21 RX ORDER — ERGOCALCIFEROL 1.25 MG/1
50000 CAPSULE ORAL
COMMUNITY

## 2019-03-21 SDOH — HEALTH STABILITY: MENTAL HEALTH: HOW OFTEN DO YOU HAVE A DRINK CONTAINING ALCOHOL?: NEVER

## 2019-03-21 ASSESSMENT — ENCOUNTER SYMPTOMS
CONSTITUTIONAL NEGATIVE: 1
SORE THROAT: 1
ALLERGIC/IMMUNOLOGIC NEGATIVE: 1
COUGH: 1
NEUROLOGICAL NEGATIVE: 1
SWOLLEN GLANDS: 0

## 2019-03-21 ASSESSMENT — PAIN SCALES - GENERAL: PAINLEVEL: 3-4

## 2019-04-28 ENCOUNTER — HOSPITAL ENCOUNTER (OUTPATIENT)
Age: 29
Discharge: HOME | End: 2019-04-28
Attending: EMERGENCY MEDICINE
Payer: COMMERCIAL

## 2019-04-28 VITALS
RESPIRATION RATE: 20 BRPM | SYSTOLIC BLOOD PRESSURE: 128 MMHG | OXYGEN SATURATION: 99 % | BODY MASS INDEX: 26.58 KG/M2 | TEMPERATURE: 98 F | HEART RATE: 81 BPM | HEIGHT: 63 IN | WEIGHT: 150 LBS | DIASTOLIC BLOOD PRESSURE: 75 MMHG

## 2019-04-28 DIAGNOSIS — H01.001 BLEPHARITIS OF RIGHT UPPER EYELID, UNSPECIFIED TYPE: Primary | ICD-10-CM

## 2019-04-28 PROCEDURE — 99213 OFFICE O/P EST LOW 20 MIN: CPT

## 2019-04-28 PROCEDURE — 99214 OFFICE O/P EST MOD 30 MIN: CPT

## 2019-04-28 RX ORDER — CEPHALEXIN 500 MG/1
500 CAPSULE ORAL 3 TIMES DAILY
Qty: 21 CAPSULE | Refills: 0 | Status: SHIPPED | OUTPATIENT
Start: 2019-04-28 | End: 2019-05-05

## 2019-04-28 NOTE — ED PROVIDER NOTES
Patient Seen in: [facility]    History   Patient presents with:   Eye Visual Problem (opthalmic)    Stated Complaint: right eye problem     HPI    The patient is a 78-year-old female with history of high ISMAEL levels without Normocephalic atraumatic  Eyes: PERRLA, EOMI   conjunctiva noninjected, no exudate  Tender nodular density in the right upper eyelid  No visible chalazion  Diffuse erythema of the right upper eyelid to the crease  No involvement of the lower eyelid  Ears:

## 2019-04-28 NOTE — ED INITIAL ASSESSMENT (HPI)
Reports swelling to right upper eyelid x 5 days. Denies discharge from the eye. Usually wears contacts.

## 2019-07-08 ENCOUNTER — HOSPITAL ENCOUNTER (OUTPATIENT)
Age: 29
Discharge: ANOTHER HOSPITAL | End: 2019-07-08
Attending: EMERGENCY MEDICINE
Payer: COMMERCIAL

## 2019-07-08 ENCOUNTER — HOSPITAL ENCOUNTER (EMERGENCY)
Facility: HOSPITAL | Age: 29
Discharge: STILL ADMITTED OR ADMINISTRATIVE | End: 2019-07-09
Payer: COMMERCIAL

## 2019-07-08 VITALS
DIASTOLIC BLOOD PRESSURE: 77 MMHG | SYSTOLIC BLOOD PRESSURE: 121 MMHG | HEART RATE: 75 BPM | WEIGHT: 150 LBS | OXYGEN SATURATION: 100 % | HEIGHT: 63 IN | RESPIRATION RATE: 18 BRPM | TEMPERATURE: 98 F | BODY MASS INDEX: 26.58 KG/M2

## 2019-07-08 DIAGNOSIS — R10.32 LLQ PAIN: Primary | ICD-10-CM

## 2019-07-08 PROCEDURE — 99213 OFFICE O/P EST LOW 20 MIN: CPT

## 2019-07-08 PROCEDURE — 99212 OFFICE O/P EST SF 10 MIN: CPT

## 2019-07-08 NOTE — ED PROVIDER NOTES
Patient Seen in: [facility] In [address] [place] [address]    History   Patient presents with:  Abdomen/Flank Pain (GI/)    Stated Complaint: Left Abd Pain    HPI    Patient complains of llq abdominal pain that began 2 weeks ago and not getting any Temp 98.4 °F (36.9 °C)   Temp src Oral   SpO2 100 %   O2 Device None (Room air)       Current:/77   Pulse 75   Temp 98.4 °F (36.9 °C) (Oral)   Resp 18   Ht 160 cm (5' 3\")   Wt 68 kg   LMP 07/01/2019   SpO2 100%   BMI 26.57 kg/m²   Pulse ox

## 2019-07-08 NOTE — ED INITIAL ASSESSMENT (HPI)
Pt complaining of left lower quadrant abd pain for 2 weeks. No fever or diarrhea. Last emesis was 2 days ago. +nausea. Pt states she feels tired. Pt states hx of ovarian cysts and IBS. Pt tried Bentyl, but no relief.

## 2019-07-09 ENCOUNTER — OFFICE VISIT (OUTPATIENT)
Dept: INTERNAL MEDICINE CLINIC | Facility: CLINIC | Age: 29
End: 2019-07-09
Payer: COMMERCIAL

## 2019-07-09 VITALS
SYSTOLIC BLOOD PRESSURE: 114 MMHG | HEART RATE: 89 BPM | HEIGHT: 63 IN | BODY MASS INDEX: 27.46 KG/M2 | DIASTOLIC BLOOD PRESSURE: 70 MMHG | WEIGHT: 155 LBS | OXYGEN SATURATION: 99 %

## 2019-07-09 DIAGNOSIS — R10.32 INTERMITTENT LEFT LOWER QUADRANT ABDOMINAL PAIN: Primary | ICD-10-CM

## 2019-07-09 PROCEDURE — 99214 OFFICE O/P EST MOD 30 MIN: CPT | Performed by: INTERNAL MEDICINE

## 2019-07-09 RX ORDER — NAPROXEN 500 MG/1
500 TABLET ORAL 2 TIMES DAILY WITH MEALS
Qty: 20 TABLET | Refills: 1 | Status: SHIPPED | OUTPATIENT
Start: 2019-07-09 | End: 2019-07-09

## 2019-07-09 RX ORDER — NAPROXEN 500 MG/1
TABLET, DELAYED RELEASE ORAL
Qty: 180 TABLET | Refills: 1 | Status: SHIPPED | OUTPATIENT
Start: 2019-07-09 | End: 2020-02-10

## 2019-07-09 NOTE — PROGRESS NOTES
HPI:    Patient ID: Jaylon Tran is a 26year old female. Pt here for evaluation of ongoing left lower quadrant pain 4 out of 10 pain scale. Has known hx of IBS - bentyl has not helped.   Has hx of ovarian cysts in the past.  Would be open to BCP

## 2019-07-13 ENCOUNTER — HOSPITAL ENCOUNTER (EMERGENCY)
Facility: HOSPITAL | Age: 29
Discharge: HOME | End: 2019-07-13
Attending: EMERGENCY MEDICINE
Payer: COMMERCIAL

## 2019-07-13 ENCOUNTER — APPOINTMENT (OUTPATIENT)
Dept: ULTRASOUND IMAGING | Facility: HOSPITAL | Age: 29
End: 2019-07-13
Attending: EMERGENCY MEDICINE
Payer: COMMERCIAL

## 2019-07-13 ENCOUNTER — APPOINTMENT (OUTPATIENT)
Dept: CT IMAGING | Facility: HOSPITAL | Age: 29
End: 2019-07-13
Attending: EMERGENCY MEDICINE
Payer: COMMERCIAL

## 2019-07-13 VITALS
BODY MASS INDEX: 27.46 KG/M2 | TEMPERATURE: 98 F | DIASTOLIC BLOOD PRESSURE: 86 MMHG | WEIGHT: 155 LBS | RESPIRATION RATE: 18 BRPM | HEART RATE: 76 BPM | SYSTOLIC BLOOD PRESSURE: 120 MMHG | OXYGEN SATURATION: 99 % | HEIGHT: 63 IN

## 2019-07-13 DIAGNOSIS — R10.9 ABDOMINAL PAIN OF UNKNOWN ETIOLOGY: Primary | ICD-10-CM

## 2019-07-13 LAB
ANION GAP SERPL CALC-SCNC: 3 MMOL/L (ref 0–18)
B-HCG UR QL: NEGATIVE
BACTERIA UR QL AUTO: NEGATIVE /HPF
BASOPHILS # BLD AUTO: 0.06 X10(3) UL (ref 0–0.2)
BASOPHILS NFR BLD AUTO: 0.5 %
BILIRUB UR QL: NEGATIVE
BUN BLD-MCNC: 12 MG/DL (ref 7–18)
BUN/CREAT SERPL: 17.9 (ref 10–20)
CALCIUM BLD-MCNC: 8.6 MG/DL (ref 8.5–10.1)
CHLORIDE SERPL-SCNC: 111 MMOL/L (ref 98–112)
CLARITY UR: CLEAR
CO2 SERPL-SCNC: 26 MMOL/L (ref 21–32)
COLOR UR: YELLOW
CREAT BLD-MCNC: 0.67 MG/DL (ref 0.55–1.02)
DEPRECATED RDW RBC AUTO: 40 FL (ref 35.1–46.3)
EOSINOPHIL # BLD AUTO: 0.45 X10(3) UL (ref 0–0.7)
EOSINOPHIL NFR BLD AUTO: 3.4 %
ERYTHROCYTE [DISTWIDTH] IN BLOOD BY AUTOMATED COUNT: 12.6 % (ref 11–15)
GLUCOSE BLD-MCNC: 93 MG/DL (ref 70–99)
GLUCOSE UR-MCNC: NEGATIVE MG/DL
HCT VFR BLD AUTO: 40.5 % (ref 35–48)
HGB BLD-MCNC: 13.5 G/DL (ref 12–16)
HGB UR QL STRIP.AUTO: NEGATIVE
IMM GRANULOCYTES # BLD AUTO: 0.05 X10(3) UL (ref 0–1)
IMM GRANULOCYTES NFR BLD: 0.4 %
LYMPHOCYTES # BLD AUTO: 3.79 X10(3) UL (ref 1–4)
LYMPHOCYTES NFR BLD AUTO: 29 %
MCH RBC QN AUTO: 29.1 PG (ref 26–34)
MCHC RBC AUTO-ENTMCNC: 33.3 G/DL (ref 31–37)
MCV RBC AUTO: 87.3 FL (ref 80–100)
MONOCYTES # BLD AUTO: 0.61 X10(3) UL (ref 0.1–1)
MONOCYTES NFR BLD AUTO: 4.7 %
NEUTROPHILS # BLD AUTO: 8.11 X10 (3) UL (ref 1.5–7.7)
NEUTROPHILS # BLD AUTO: 8.11 X10(3) UL (ref 1.5–7.7)
NEUTROPHILS NFR BLD AUTO: 62 %
NITRITE UR QL STRIP.AUTO: NEGATIVE
OSMOLALITY SERPL CALC.SUM OF ELEC: 289 MOSM/KG (ref 275–295)
PH UR: 5 [PH] (ref 5–8)
PLATELET # BLD AUTO: 299 10(3)UL (ref 150–450)
POTASSIUM SERPL-SCNC: 3.6 MMOL/L (ref 3.5–5.1)
PROT UR-MCNC: NEGATIVE MG/DL
RBC # BLD AUTO: 4.64 X10(6)UL (ref 3.8–5.3)
RBC #/AREA URNS AUTO: 3 /HPF
SODIUM SERPL-SCNC: 140 MMOL/L (ref 136–145)
SP GR UR STRIP: 1.03 (ref 1–1.03)
UROBILINOGEN UR STRIP-ACNC: 2
VIT C UR-MCNC: 40 MG/DL
WBC # BLD AUTO: 13.1 X10(3) UL (ref 4–11)
WBC #/AREA URNS AUTO: 3 /HPF

## 2019-07-13 PROCEDURE — 85025 COMPLETE CBC W/AUTO DIFF WBC: CPT | Performed by: EMERGENCY MEDICINE

## 2019-07-13 PROCEDURE — 76830 TRANSVAGINAL US NON-OB: CPT | Performed by: EMERGENCY MEDICINE

## 2019-07-13 PROCEDURE — 93975 VASCULAR STUDY: CPT | Performed by: EMERGENCY MEDICINE

## 2019-07-13 PROCEDURE — 96374 THER/PROPH/DIAG INJ IV PUSH: CPT

## 2019-07-13 PROCEDURE — 80048 BASIC METABOLIC PNL TOTAL CA: CPT | Performed by: EMERGENCY MEDICINE

## 2019-07-13 PROCEDURE — 99285 EMERGENCY DEPT VISIT HI MDM: CPT

## 2019-07-13 PROCEDURE — 81001 URINALYSIS AUTO W/SCOPE: CPT | Performed by: EMERGENCY MEDICINE

## 2019-07-13 PROCEDURE — 96361 HYDRATE IV INFUSION ADD-ON: CPT

## 2019-07-13 PROCEDURE — 76856 US EXAM PELVIC COMPLETE: CPT | Performed by: EMERGENCY MEDICINE

## 2019-07-13 PROCEDURE — 81025 URINE PREGNANCY TEST: CPT

## 2019-07-13 PROCEDURE — 74177 CT ABD & PELVIS W/CONTRAST: CPT | Performed by: EMERGENCY MEDICINE

## 2019-07-13 RX ORDER — KETOROLAC TROMETHAMINE 15 MG/ML
15 INJECTION, SOLUTION INTRAMUSCULAR; INTRAVENOUS ONCE
Status: COMPLETED | OUTPATIENT
Start: 2019-07-13 | End: 2019-07-13

## 2019-07-13 NOTE — ED NOTES
Pt states \"I've felt the need to pee for a couple hours but can't seem to get any out. \"  Bladder scan shows 23cc of urine

## 2019-07-13 NOTE — ED NOTES
Pt rang call light stating that her IV was hurting her and that she wanted to leave AMA. Patient's IV flushed, remains in place at this time. MD notified of patient's desire to leave. AMA form printed. Primary RN notified.

## 2019-07-13 NOTE — ED NOTES
Discharge instructions reviewed. Pt verbalized understanding with no further questions. Pain controlled. Steady gait. Speaking in full clear sentences at discharge.

## 2019-07-13 NOTE — ED INITIAL ASSESSMENT (HPI)
LLQ abd pain for the last 1.5 weeks, hx of ovarian cysts. Seen at [address], and GP, US ordered, however pt reports no comfortable position tonight and unable to sleep.

## 2019-07-13 NOTE — ED PROVIDER NOTES
Patient Seen in: [facility] AND [facility] Emergency Department    History   Patient presents with:  Abdominal Pain    Stated Complaint: abdominal pain    HPI    27 yo female with 10 days of abdominal pain, seen by pmd and has ultrasound ordered as outpatient.  Carlos Alberto Lee and intact distal pulses. Pulmonary/Chest: Effort normal and breath sounds normal.   Abdominal: Soft. Bowel sounds are normal. She exhibits no distension and no mass. There is tenderness (left mid and left lower quadrants).  There is no rebound and no gua for left lower quadrant abdominal pain. Other: Lung bases clear. Heart size normal.  No biliary ductal dilatation. Bilateral symmetric nephrograms without hydronephrosis. No evidence of appendicitis. No pericolonic inflammation.   No free air or fr

## 2019-07-15 ENCOUNTER — TELEPHONE (OUTPATIENT)
Dept: INTERNAL MEDICINE CLINIC | Facility: CLINIC | Age: 29
End: 2019-07-15

## 2019-07-15 NOTE — TELEPHONE ENCOUNTER
Suggested pt make apt with gynecology- will try levsin for pain. Has a hemmorhagic cyst on Us of pelvis.

## 2019-07-15 NOTE — TELEPHONE ENCOUNTER
Still in much abdominal pain which ranges from 5 to 8. Was in ER over the weekend. US and CT done. Needs to understand results. What to do next??  Pain in right back just below ribs. Pain in left lower abdomen worse when laying down.

## 2019-08-05 ENCOUNTER — OFFICE VISIT (OUTPATIENT)
Dept: INTERNAL MEDICINE CLINIC | Facility: CLINIC | Age: 29
End: 2019-08-05
Payer: COMMERCIAL

## 2019-08-05 VITALS
HEIGHT: 63 IN | DIASTOLIC BLOOD PRESSURE: 74 MMHG | OXYGEN SATURATION: 99 % | TEMPERATURE: 99 F | SYSTOLIC BLOOD PRESSURE: 100 MMHG | HEART RATE: 84 BPM | WEIGHT: 151 LBS | BODY MASS INDEX: 26.75 KG/M2

## 2019-08-05 DIAGNOSIS — H60.311 ACUTE DIFFUSE OTITIS EXTERNA OF RIGHT EAR: Primary | ICD-10-CM

## 2019-08-05 PROCEDURE — 99213 OFFICE O/P EST LOW 20 MIN: CPT | Performed by: INTERNAL MEDICINE

## 2019-08-05 RX ORDER — NEOMYCIN SULFATE, POLYMYXIN B SULFATE, HYDROCORTISONE 3.5; 10000; 1 MG/ML; [USP'U]/ML; MG/ML
3 SOLUTION/ DROPS AURICULAR (OTIC) 3 TIMES DAILY
Qty: 1 BOTTLE | Refills: 0 | Status: SHIPPED | OUTPATIENT
Start: 2019-08-05 | End: 2020-03-03

## 2019-08-05 RX ORDER — NORETHINDRONE ACETATE AND ETHINYL ESTRADIOL AND FERROUS FUMARATE 1MG-20(24)
KIT ORAL
Refills: 3 | COMMUNITY
Start: 2019-07-18 | End: 2020-03-03

## 2019-08-05 NOTE — PROGRESS NOTES
HPI:    Patient ID: Neela Oliver is a 26year old female. Pt here for evalaution of right ear discomfort for about a week. No fevers or ear dc. No sore throat or associated symptoms. Has been swimming a bit with her son.     Review of Systems

## 2019-09-09 ENCOUNTER — PATIENT MESSAGE (OUTPATIENT)
Dept: INTERNAL MEDICINE CLINIC | Facility: CLINIC | Age: 29
End: 2019-09-09

## 2019-10-29 ENCOUNTER — OFFICE VISIT (OUTPATIENT)
Dept: INTERNAL MEDICINE CLINIC | Facility: CLINIC | Age: 29
End: 2019-10-29
Payer: COMMERCIAL

## 2019-10-29 VITALS
BODY MASS INDEX: 29.12 KG/M2 | OXYGEN SATURATION: 98 % | HEIGHT: 63 IN | WEIGHT: 164.38 LBS | HEART RATE: 72 BPM | DIASTOLIC BLOOD PRESSURE: 76 MMHG | SYSTOLIC BLOOD PRESSURE: 108 MMHG

## 2019-10-29 DIAGNOSIS — E55.9 VITAMIN D DEFICIENCY: ICD-10-CM

## 2019-10-29 DIAGNOSIS — L65.9 HAIR THINNING: Primary | ICD-10-CM

## 2019-10-29 PROCEDURE — 84443 ASSAY THYROID STIM HORMONE: CPT | Performed by: INTERNAL MEDICINE

## 2019-10-29 PROCEDURE — 99213 OFFICE O/P EST LOW 20 MIN: CPT | Performed by: INTERNAL MEDICINE

## 2019-10-29 PROCEDURE — 82306 VITAMIN D 25 HYDROXY: CPT | Performed by: INTERNAL MEDICINE

## 2019-10-29 NOTE — PROGRESS NOTES
HPI:    Patient ID: Luma Carranza is a 26year old female. HPI Pt here for fu on severe vit d def - treated aggressively with high dose vit d for several months.   Currently has noted some recurring hair loss and fatigue suggestive of recurrence of

## 2019-11-14 ENCOUNTER — HOSPITAL ENCOUNTER (OUTPATIENT)
Age: 29
Discharge: HOME | End: 2019-11-14
Payer: COMMERCIAL

## 2019-11-14 VITALS
BODY MASS INDEX: 24.8 KG/M2 | WEIGHT: 140 LBS | TEMPERATURE: 99 F | HEART RATE: 79 BPM | DIASTOLIC BLOOD PRESSURE: 70 MMHG | OXYGEN SATURATION: 97 % | HEIGHT: 63 IN | RESPIRATION RATE: 20 BRPM | SYSTOLIC BLOOD PRESSURE: 125 MMHG

## 2019-11-14 DIAGNOSIS — J01.90 ACUTE SINUSITIS, RECURRENCE NOT SPECIFIED, UNSPECIFIED LOCATION: Primary | ICD-10-CM

## 2019-11-14 PROCEDURE — 99214 OFFICE O/P EST MOD 30 MIN: CPT

## 2019-11-14 PROCEDURE — 99213 OFFICE O/P EST LOW 20 MIN: CPT

## 2019-11-14 RX ORDER — AMOXICILLIN AND CLAVULANATE POTASSIUM 875; 125 MG/1; MG/1
1 TABLET, FILM COATED ORAL 2 TIMES DAILY
Qty: 20 TABLET | Refills: 0 | Status: SHIPPED | OUTPATIENT
Start: 2019-11-14 | End: 2019-11-24

## 2019-11-14 NOTE — ED PROVIDER NOTES
Patient presents with:  Sinusitis      HPI:     Maryam Greenberg is a 26year old female who presents with a chief complaint of frontal and maxillary sinus congestion, thick purulent drainage from the nose, sinus discomfort, and a dry cough for the past on file      Food insecurity:        Worry: Not on file        Inability: Not on file      Transportation needs:        Medical: Not on file        Non-medical: Not on file    Tobacco Use      Smoking status: Never Smoker      Smokeless tobacco: Never Used clear  EARS: TM  bilateral: fluid present  NOSE: nasal turbinates: swollen and red  THROAT: redness noted, post nasal drip, uvula midline and airway patent  LUNGS: clear to auscultation bilaterally; no rales, rhonchi, or wheezes    MDM/Assessment/Plan:   O

## 2019-11-14 NOTE — ED INITIAL ASSESSMENT (HPI)
Reports 2 week hx of nasal congestion and frontal sinus pressure. Denies fevers. Reports mild throat discomfort as well.

## 2019-12-05 ENCOUNTER — TELEPHONE (OUTPATIENT)
Dept: INTERNAL MEDICINE CLINIC | Facility: CLINIC | Age: 29
End: 2019-12-05

## 2019-12-05 DIAGNOSIS — K52.9 SEVERE DIARRHEA: Primary | ICD-10-CM

## 2019-12-05 NOTE — TELEPHONE ENCOUNTER
Having diarrhea for several weeks. Was on Augmentin when this started. Foul smelling, 5-6 times daily, some cramping, mucous and some red blood.    Has not been taking anything for it; wanted to ask PCP first.    Educated patient on the possibility of h

## 2019-12-06 ENCOUNTER — TELEPHONE (OUTPATIENT)
Dept: INTERNAL MEDICINE CLINIC | Facility: CLINIC | Age: 29
End: 2019-12-06

## 2019-12-06 ENCOUNTER — NURSE ONLY (OUTPATIENT)
Dept: INTERNAL MEDICINE CLINIC | Facility: CLINIC | Age: 29
End: 2019-12-06
Payer: COMMERCIAL

## 2019-12-06 DIAGNOSIS — R19.7 DIARRHEA OF PRESUMED INFECTIOUS ORIGIN: ICD-10-CM

## 2019-12-06 PROCEDURE — 87493 C DIFF AMPLIFIED PROBE: CPT | Performed by: INTERNAL MEDICINE

## 2019-12-27 ENCOUNTER — TELEPHONE (OUTPATIENT)
Dept: INTERNAL MEDICINE CLINIC | Facility: CLINIC | Age: 29
End: 2019-12-27

## 2019-12-27 NOTE — TELEPHONE ENCOUNTER
Patient called asking if Dr. Landon Garduno could please give her a call. She did not mention why she needed the doctor to call her.

## 2020-02-10 RX ORDER — NAPROXEN 500 MG/1
TABLET, DELAYED RELEASE ORAL
Qty: 180 TABLET | Refills: 1 | Status: SHIPPED | OUTPATIENT
Start: 2020-02-10 | End: 2020-03-03

## 2020-02-10 NOTE — TELEPHONE ENCOUNTER
Requested Prescriptions     Pending Prescriptions Disp Refills   • EC-NAPROXEN 500 MG Oral Tab EC [Pharmacy Med Name: EC-NAPROXEN 500MG TABLETS] 180 tablet 1     Sig: TAKE 1 TABLET(500 MG) BY MOUTH TWICE DAILY WITH MEALS     Last office visit: 10-29-19  Me

## 2020-02-25 NOTE — PROGRESS NOTES
[address] [facility] [facility] Follow-up Visit    Ariela below  - No known issues with sedation.  - No known history of sleep apnea. Pertinent Family Hx:  + Colon polyps, paternal grandfather  - No family hx of esophageal, gastric or colon cancer  - No family history of IBD.     Prior endoscopies:  Denies    S vision  RESPIRATORY:  negative for  shortness of breath  CARDIOVASCULAR:  negative for  chest pain  GASTROINTESTINAL:  see HPI  GENITOURINARY:  negative for dysuria and hematuria   SKIN:  negative for  rash  ALLERGIC/IMMUNOLOGIC:  negative for hay fever al was diagnosed with C. difficile in December 2019. She has had multiple CT scans both during her initial consultation with Dr. Nel Shaffer and last summer during an ER visit. No acute findings for her pain were found. No history of diverticulitis.   Symptoms fel Tissue Transglutaminase Ab, IgA      Immunoglobulin A, Qn, Serum (IGA)      Meds This Visit:  Requested Prescriptions     Signed Prescriptions Disp Refills   • Dicyclomine HCl 10 MG Oral Cap 60 capsule 1     Sig: Take 1 capsule (10 mg total) by mouth 2 (

## 2020-03-03 ENCOUNTER — OFFICE VISIT (OUTPATIENT)
Dept: GASTROENTEROLOGY | Facility: CLINIC | Age: 30
End: 2020-03-03
Payer: COMMERCIAL

## 2020-03-03 VITALS
SYSTOLIC BLOOD PRESSURE: 133 MMHG | WEIGHT: 156 LBS | DIASTOLIC BLOOD PRESSURE: 85 MMHG | BODY MASS INDEX: 27.64 KG/M2 | HEART RATE: 83 BPM | HEIGHT: 63 IN

## 2020-03-03 DIAGNOSIS — R10.32 LLQ PAIN: ICD-10-CM

## 2020-03-03 DIAGNOSIS — R19.8 IRREGULAR BOWEL HABITS: Primary | ICD-10-CM

## 2020-03-03 DIAGNOSIS — Z86.19 HISTORY OF CLOSTRIDIOIDES DIFFICILE COLITIS: ICD-10-CM

## 2020-03-03 PROCEDURE — 99214 OFFICE O/P EST MOD 30 MIN: CPT | Performed by: NURSE PRACTITIONER

## 2020-03-03 RX ORDER — DICYCLOMINE HYDROCHLORIDE 10 MG/1
10 CAPSULE ORAL 2 TIMES DAILY PRN
Qty: 60 CAPSULE | Refills: 1 | Status: SHIPPED | OUTPATIENT
Start: 2020-03-03

## 2020-03-03 NOTE — PATIENT INSTRUCTIONS
-Complete celiac sprue testing   -Consider FODMAP diet (http://boone.com/), probiotics, fiber supplements, cutting out dairy/gluten  -Continue dicyclomine daily/as needed  -Consider IBgard  -Follow-up in 2-3 months

## 2020-03-10 ENCOUNTER — APPOINTMENT (OUTPATIENT)
Dept: LAB | Age: 30
End: 2020-03-10
Attending: NURSE PRACTITIONER
Payer: COMMERCIAL

## 2020-03-10 DIAGNOSIS — R19.8 IRREGULAR BOWEL HABITS: ICD-10-CM

## 2020-03-10 DIAGNOSIS — R10.32 LLQ PAIN: ICD-10-CM

## 2020-03-10 LAB — IGA SERPL-MCNC: 234 MG/DL (ref 70–312)

## 2020-03-10 PROCEDURE — 82784 ASSAY IGA/IGD/IGG/IGM EACH: CPT | Performed by: NURSE PRACTITIONER

## 2020-03-10 PROCEDURE — 83516 IMMUNOASSAY NONANTIBODY: CPT

## 2020-03-10 PROCEDURE — 36415 COLL VENOUS BLD VENIPUNCTURE: CPT

## 2020-03-11 LAB — TTG IGA SER-ACNC: 0.7 U/ML (ref ?–7)

## 2020-03-29 ENCOUNTER — E-VISIT (OUTPATIENT)
Dept: FAMILY MEDICINE CLINIC | Facility: CLINIC | Age: 30
End: 2020-03-29

## 2020-03-29 DIAGNOSIS — Z86.19 HISTORY OF CLOSTRIDIOIDES DIFFICILE COLITIS: ICD-10-CM

## 2020-03-29 DIAGNOSIS — J02.9 SORE THROAT: Primary | ICD-10-CM

## 2020-03-29 NOTE — PROGRESS NOTES
Advised to be evaluted in person at the [facility] for strep testing due to hx of c-diff will avoid empiric tx and to be tested to confirm strep throat. [address] and provider is agreeable to plan of care.

## 2020-03-30 ENCOUNTER — E-VISIT (OUTPATIENT)
Dept: FAMILY MEDICINE CLINIC | Facility: CLINIC | Age: 30
End: 2020-03-30

## 2020-03-30 DIAGNOSIS — J02.9 SORE THROAT: Primary | ICD-10-CM

## 2020-03-30 RX ORDER — AMOXICILLIN 500 MG/1
TABLET, FILM COATED ORAL
Qty: 21 TABLET | Refills: 0 | Status: SHIPPED | OUTPATIENT
Start: 2020-03-30 | End: 2020-06-10 | Stop reason: ALTCHOICE

## 2020-03-30 NOTE — TELEPHONE ENCOUNTER
Patient C/o soreness of throat with fever of 100 F. Has white  Spot behind her throat. H/o C dif on probiotics. Denies cough , body ache. A/P   Acute pharyngitis  Amoxicillin 500 mgs TID for 7 days  Take Probiotics. F/u with not improved.

## 2020-04-02 NOTE — PROGRESS NOTES
Regina Mac is a 26year old female. HPI:   See answers to questions above.      Current Outpatient Medications   Medication Sig Dispense Refill   • amoxicillin 500 MG Oral Tab 1 tab TID 21 tablet 0   • Dicyclomine HCl 10 MG Oral Cap Take 1 capsule

## 2020-06-10 ENCOUNTER — OFFICE VISIT (OUTPATIENT)
Dept: INTERNAL MEDICINE CLINIC | Facility: CLINIC | Age: 30
End: 2020-06-10
Payer: COMMERCIAL

## 2020-06-10 VITALS
WEIGHT: 153.38 LBS | RESPIRATION RATE: 16 BRPM | SYSTOLIC BLOOD PRESSURE: 104 MMHG | DIASTOLIC BLOOD PRESSURE: 70 MMHG | HEART RATE: 75 BPM | OXYGEN SATURATION: 98 % | BODY MASS INDEX: 27 KG/M2

## 2020-06-10 DIAGNOSIS — H68.003 EUSTACHIAN CATARRH, BILATERAL: Primary | ICD-10-CM

## 2020-06-10 PROCEDURE — 99213 OFFICE O/P EST LOW 20 MIN: CPT | Performed by: INTERNAL MEDICINE

## 2020-06-10 NOTE — PROGRESS NOTES
HPI:    Patient ID: Edmundo Ramirez is a 31year old female. HPIpt here for evaluation of bilaterally plugged ears for a few weeks. No pain or dc from the ears. No fever or chills. Review of Systems   Constitutional: Negative for fever.    HENT:

## 2020-06-25 ENCOUNTER — OFFICE VISIT (OUTPATIENT)
Dept: AUDIOLOGY | Facility: CLINIC | Age: 30
End: 2020-06-25
Payer: COMMERCIAL

## 2020-06-25 ENCOUNTER — OFFICE VISIT (OUTPATIENT)
Dept: OTOLARYNGOLOGY | Facility: CLINIC | Age: 30
End: 2020-06-25
Payer: COMMERCIAL

## 2020-06-25 VITALS — TEMPERATURE: 98 F | BODY MASS INDEX: 27.11 KG/M2 | WEIGHT: 153 LBS | HEIGHT: 63 IN

## 2020-06-25 DIAGNOSIS — H69.92 EUSTACHIAN TUBE DISORDER, LEFT: Primary | ICD-10-CM

## 2020-06-25 DIAGNOSIS — H91.90 HEARING LOSS, UNSPECIFIED HEARING LOSS TYPE, UNSPECIFIED LATERALITY: Primary | ICD-10-CM

## 2020-06-25 PROCEDURE — 99243 OFF/OP CNSLTJ NEW/EST LOW 30: CPT | Performed by: OTOLARYNGOLOGY

## 2020-06-25 PROCEDURE — 92557 COMPREHENSIVE HEARING TEST: CPT | Performed by: AUDIOLOGIST

## 2020-06-25 PROCEDURE — 92567 TYMPANOMETRY: CPT | Performed by: AUDIOLOGIST

## 2020-06-25 NOTE — PROGRESS NOTES
Ema Tello is a 31year old female. Patient presents with:  Ear Problem: left ear feels clogged    HPI:   She is a teacher of special education.   She feels that in the last few weeks she has been having difficulty hearing with a muffled feeling in Nasal Normal External nose - Normal. Nasal septum - Normal, Turbinates - Normal   Neurological Normal Memory - Normal. Cranial nerves - Cranial nerves II through XII grossly intact.    Neck Exam Normal Inspection - Normal. Palpation - Normal. Parotid glan

## 2020-06-25 NOTE — PROGRESS NOTES
AUDIOGRAM     [facility] was referred for testing by Mohan Enamorado V for clogged left ear. 5/9/1990  [id number]    Otoscopic inspection: right ear no cerumen; left ear no cerumen.        Tests/Procedures  Patient was tested via  standard insert ear

## 2021-01-30 ENCOUNTER — LAB ENCOUNTER (OUTPATIENT)
Dept: LAB | Age: 31
End: 2021-01-30
Attending: INTERNAL MEDICINE
Payer: COMMERCIAL

## 2021-01-30 DIAGNOSIS — L65.9 HAIR LOSS: ICD-10-CM

## 2021-01-30 PROCEDURE — 36415 COLL VENOUS BLD VENIPUNCTURE: CPT

## 2021-01-30 PROCEDURE — 82306 VITAMIN D 25 HYDROXY: CPT

## 2021-02-01 LAB — 25(OH)D3 SERPL-MCNC: 14.5 NG/ML (ref 30–100)

## 2021-02-01 RX ORDER — ERGOCALCIFEROL 1.25 MG/1
50000 CAPSULE ORAL WEEKLY
Qty: 12 CAPSULE | Refills: 3 | Status: SHIPPED | OUTPATIENT
Start: 2021-02-01 | End: 2021-03-03

## 2021-05-26 VITALS
RESPIRATION RATE: 16 BRPM | BODY MASS INDEX: 28.3 KG/M2 | DIASTOLIC BLOOD PRESSURE: 72 MMHG | HEART RATE: 76 BPM | TEMPERATURE: 98.9 F | WEIGHT: 159.72 LBS | SYSTOLIC BLOOD PRESSURE: 110 MMHG | HEIGHT: 63 IN

## 2021-06-29 ENCOUNTER — PATIENT MESSAGE (OUTPATIENT)
Dept: INTERNAL MEDICINE CLINIC | Facility: CLINIC | Age: 31
End: 2021-06-29

## 2021-06-30 NOTE — TELEPHONE ENCOUNTER
From: [facility]  To: Sriram Franklin MD  Sent: 6/29/2021 6:08 PM CDT  Subject: Non-Urgent Darling Nguyen,    I was wondering if it would be possible for you to order a CBC blood panel for me.  I am not sure what is included in t

## 2021-07-03 ENCOUNTER — LAB ENCOUNTER (OUTPATIENT)
Dept: LAB | Facility: REFERENCE LAB | Age: 31
End: 2021-07-03
Attending: INTERNAL MEDICINE
Payer: COMMERCIAL

## 2021-07-03 DIAGNOSIS — R53.83 FATIGUE, UNSPECIFIED TYPE: ICD-10-CM

## 2021-07-03 LAB
BASOPHILS # BLD AUTO: 0.05 X10(3) UL (ref 0–0.2)
BASOPHILS NFR BLD AUTO: 0.6 %
DEPRECATED RDW RBC AUTO: 38.8 FL (ref 35.1–46.3)
EOSINOPHIL # BLD AUTO: 0.16 X10(3) UL (ref 0–0.7)
EOSINOPHIL NFR BLD AUTO: 2.1 %
ERYTHROCYTE [DISTWIDTH] IN BLOOD BY AUTOMATED COUNT: 12.2 % (ref 11–15)
HCT VFR BLD AUTO: 41 %
HGB BLD-MCNC: 13.4 G/DL
IMM GRANULOCYTES # BLD AUTO: 0.03 X10(3) UL (ref 0–1)
IMM GRANULOCYTES NFR BLD: 0.4 %
LYMPHOCYTES # BLD AUTO: 2.45 X10(3) UL (ref 1–4)
LYMPHOCYTES NFR BLD AUTO: 31.7 %
MCH RBC QN AUTO: 28.3 PG (ref 26–34)
MCHC RBC AUTO-ENTMCNC: 32.7 G/DL (ref 31–37)
MCV RBC AUTO: 86.7 FL
MONOCYTES # BLD AUTO: 0.41 X10(3) UL (ref 0.1–1)
MONOCYTES NFR BLD AUTO: 5.3 %
NEUTROPHILS # BLD AUTO: 4.63 X10 (3) UL (ref 1.5–7.7)
NEUTROPHILS # BLD AUTO: 4.63 X10(3) UL (ref 1.5–7.7)
NEUTROPHILS NFR BLD AUTO: 59.9 %
PLATELET # BLD AUTO: 270 10(3)UL (ref 150–450)
RBC # BLD AUTO: 4.73 X10(6)UL
T4 FREE SERPL-MCNC: 0.9 NG/DL (ref 0.8–1.7)
TSI SER-ACNC: 1.1 MIU/ML (ref 0.36–3.74)
WBC # BLD AUTO: 7.7 X10(3) UL (ref 4–11)

## 2021-07-03 PROCEDURE — 84443 ASSAY THYROID STIM HORMONE: CPT

## 2021-07-03 PROCEDURE — 85025 COMPLETE CBC W/AUTO DIFF WBC: CPT

## 2021-07-03 PROCEDURE — 82306 VITAMIN D 25 HYDROXY: CPT

## 2021-07-03 PROCEDURE — 84439 ASSAY OF FREE THYROXINE: CPT

## 2021-07-03 PROCEDURE — 36415 COLL VENOUS BLD VENIPUNCTURE: CPT

## 2021-07-05 LAB — 25(OH)D3 SERPL-MCNC: 26.1 NG/ML (ref 30–100)

## 2022-01-10 ENCOUNTER — E-VISIT (OUTPATIENT)
Dept: TELEHEALTH | Age: 32
End: 2022-01-10
Payer: COMMERCIAL

## 2022-01-10 DIAGNOSIS — Z02.9 ENCOUNTERS FOR ADMINISTRATIVE PURPOSES: Primary | ICD-10-CM

## 2022-01-11 NOTE — PROGRESS NOTES
HPI:  Dalia Nunn is a 27year old female who presents for an evisit. See [employer] communications above.     Current Outpatient Medications   Medication Sig Dispense Refill   • amoxicillin 500 MG Oral Cap Take 1 capsule (500 mg total) by mouth 3 ( examination or emergency care. Patient advised to go to ER or call 911 for worsening symptoms or acute distress.

## 2022-03-19 ENCOUNTER — APPOINTMENT (OUTPATIENT)
Dept: CT IMAGING | Age: 32
End: 2022-03-19
Attending: NURSE PRACTITIONER
Payer: COMMERCIAL

## 2022-03-19 ENCOUNTER — HOSPITAL ENCOUNTER (OUTPATIENT)
Age: 32
Discharge: HOME | End: 2022-03-19
Payer: COMMERCIAL

## 2022-03-19 VITALS
OXYGEN SATURATION: 99 % | RESPIRATION RATE: 18 BRPM | DIASTOLIC BLOOD PRESSURE: 85 MMHG | SYSTOLIC BLOOD PRESSURE: 131 MMHG | HEART RATE: 81 BPM | TEMPERATURE: 98 F

## 2022-03-19 DIAGNOSIS — R10.9 ABDOMINAL PAIN, ACUTE: Primary | ICD-10-CM

## 2022-03-19 LAB
#MXD IC: 0.6 X10ˆ3/UL (ref 0.1–1)
B-HCG UR QL: NEGATIVE
BILIRUB UR QL STRIP: NEGATIVE
BUN BLD-MCNC: 11 MG/DL (ref 7–18)
CHLORIDE BLD-SCNC: 104 MMOL/L (ref 98–112)
CO2 BLD-SCNC: 22 MMOL/L (ref 21–32)
COLOR UR: YELLOW
CREAT BLD-MCNC: 0.8 MG/DL
GLUCOSE BLD-MCNC: 87 MG/DL (ref 70–99)
GLUCOSE UR STRIP-MCNC: NEGATIVE MG/DL
HCT VFR BLD AUTO: 39.5 %
HCT VFR BLD CALC: 42 %
HGB BLD-MCNC: 13.2 G/DL
HGB UR QL STRIP: NEGATIVE
ISTAT IONIZED CALCIUM FOR CHEM 8: 1.18 MMOL/L (ref 1.12–1.32)
KETONES UR STRIP-MCNC: NEGATIVE MG/DL
LEUKOCYTE ESTERASE UR QL STRIP: NEGATIVE
LYMPHOCYTES # BLD AUTO: 3.3 X10ˆ3/UL (ref 1–4)
LYMPHOCYTES NFR BLD AUTO: 27.9 %
MCH RBC QN AUTO: 28.2 PG (ref 26–34)
MCHC RBC AUTO-ENTMCNC: 33.4 G/DL (ref 31–37)
MCV RBC AUTO: 84.4 FL (ref 80–100)
MIXED CELL %: 4.7 %
NEUTROPHILS # BLD AUTO: 7.9 X10ˆ3/UL (ref 1.5–7.7)
NEUTROPHILS NFR BLD AUTO: 67.4 %
NITRITE UR QL STRIP: NEGATIVE
PH UR STRIP: 5 [PH]
PLATELET # BLD AUTO: 281 X10ˆ3/UL (ref 150–450)
POTASSIUM BLD-SCNC: 3.8 MMOL/L (ref 3.6–5.1)
PROT UR STRIP-MCNC: NEGATIVE MG/DL
RBC # BLD AUTO: 4.68 X10ˆ6/UL
SODIUM BLD-SCNC: 137 MMOL/L (ref 136–145)
SP GR UR STRIP: >=1.03
UROBILINOGEN UR STRIP-ACNC: <2 MG/DL
WBC # BLD AUTO: 11.8 X10ˆ3/UL (ref 4–11)

## 2022-03-19 PROCEDURE — 96360 HYDRATION IV INFUSION INIT: CPT

## 2022-03-19 PROCEDURE — 99214 OFFICE O/P EST MOD 30 MIN: CPT

## 2022-03-19 PROCEDURE — 81025 URINE PREGNANCY TEST: CPT

## 2022-03-19 PROCEDURE — 99213 OFFICE O/P EST LOW 20 MIN: CPT

## 2022-03-19 PROCEDURE — 74177 CT ABD & PELVIS W/CONTRAST: CPT | Performed by: NURSE PRACTITIONER

## 2022-03-19 PROCEDURE — 81002 URINALYSIS NONAUTO W/O SCOPE: CPT

## 2022-03-19 PROCEDURE — 85025 COMPLETE CBC W/AUTO DIFF WBC: CPT | Performed by: NURSE PRACTITIONER

## 2022-03-19 PROCEDURE — 80047 BASIC METABLC PNL IONIZED CA: CPT

## 2022-03-19 RX ORDER — SODIUM CHLORIDE 9 MG/ML
1000 INJECTION, SOLUTION INTRAVENOUS ONCE
Status: COMPLETED | OUTPATIENT
Start: 2022-03-19 | End: 2022-03-19

## 2022-03-19 NOTE — ED INITIAL ASSESSMENT (HPI)
luq pain for 5 days, no n/v/d, no fever, no cp, no cough, no sob, no back or flank pain, mild urinary urgency

## 2022-06-03 ENCOUNTER — TELEPHONE (OUTPATIENT)
Dept: GASTROENTEROLOGY | Facility: CLINIC | Age: 32
End: 2022-06-03

## 2022-06-03 ENCOUNTER — OFFICE VISIT (OUTPATIENT)
Dept: GASTROENTEROLOGY | Facility: CLINIC | Age: 32
End: 2022-06-03
Payer: COMMERCIAL

## 2022-06-03 VITALS
BODY MASS INDEX: 29.38 KG/M2 | SYSTOLIC BLOOD PRESSURE: 125 MMHG | WEIGHT: 165.81 LBS | DIASTOLIC BLOOD PRESSURE: 81 MMHG | HEIGHT: 63 IN | HEART RATE: 78 BPM

## 2022-06-03 DIAGNOSIS — R19.8 CHANGE IN BOWEL FUNCTION: ICD-10-CM

## 2022-06-03 DIAGNOSIS — R14.0 ABDOMINAL BLOATING: Primary | ICD-10-CM

## 2022-06-03 DIAGNOSIS — R19.4 CHANGE IN BOWEL HABITS: Primary | ICD-10-CM

## 2022-06-03 DIAGNOSIS — K62.5 RECTAL BLEEDING: ICD-10-CM

## 2022-06-03 PROCEDURE — 99214 OFFICE O/P EST MOD 30 MIN: CPT | Performed by: INTERNAL MEDICINE

## 2022-06-03 PROCEDURE — 3008F BODY MASS INDEX DOCD: CPT | Performed by: INTERNAL MEDICINE

## 2022-06-03 PROCEDURE — 3079F DIAST BP 80-89 MM HG: CPT | Performed by: INTERNAL MEDICINE

## 2022-06-03 PROCEDURE — 3074F SYST BP LT 130 MM HG: CPT | Performed by: INTERNAL MEDICINE

## 2022-06-03 RX ORDER — LINACLOTIDE 145 UG/1
1 CAPSULE, GELATIN COATED ORAL DAILY
Qty: 30 CAPSULE | Refills: 1 | Status: SHIPPED | OUTPATIENT
Start: 2022-06-03 | End: 2022-07-03

## 2022-06-03 NOTE — PATIENT INSTRUCTIONS
1. Schedule colonoscopy with MAC [Diagnosis: change in bowels, rectal bleeding]    2.  bowel prep from pharmacy (split dose Golytely)    3. Continue all medications for procedure    4. Read all bowel prep instructions carefully    5. AVOID seeds, nuts, popcorn, raw fruits and vegetables (cooked is okay) for 2-3 days before procedure    6. You MAY need to go for COVID testing 72 hours before procedure. The testing team will call you a few days before your procedure to discuss with you if testing is required. If you are asked to go for COVID testing and do not completed the test, the procedure cannot be performed. 7. If you start any NEW medication after your visit today, please notify us. Certain medications will need to be held before the procedure, or the procedure cannot be performed. 8. Squatty potty    9.  Linzess 145mcg/day (risks/benefits discussed)

## 2022-06-03 NOTE — TELEPHONE ENCOUNTER
Scheduled for:  Colonoscopy [address]   Provider Name:  Dr. Fabien Larson  Date:  8/29/22  Location:  [facility]  Sedation:  Mac  Time: 0800 Am (pt is aware that [facility] will call the day before to confirm arrival time)   Prep:  Split dose GolytelyPrep instructions were given to pt in the office, pt verbalized understanding. Meds/Allergies Reconciled?:  Physician reviewed   Diagnosis with codes:  Changed in bowel habits R19.4 , Rectal bleeding K62.5  Was patient informed to call insurance with codes (Y/N):  Yes, I confirmed [place] insurance with the patient. The patient also verbally understands to call her insurance to check for pre-cert, codes were given on prep instructions. Referral sent?:  Yes , Referral was sent at the time of electronic surgical scheduling. [facility] or [id number] notified?: Electronic case request was sent to [facility] via [employer].. Medication Orders: This patient verbally confirmed that she is not taking:   Iron, blood thinners, BP meds, and is not diabetic   Not latex allergy, Not PCN allergy and does not have a pacemaker Pt is aware to NOT take iron pills, herbal meds and diet supplements for 7 days before exam. Also to NOT take any form of alcohol, recreational drugs and any forms of ED meds 24 hours before exam.    Misc Orders:  Patient was informed that they will need a COVID 19 test prior to their procedure. Patient verbally understood & will await a phone call from [facility] to schedule.       Further instructions given by staff:

## 2022-06-20 ENCOUNTER — PATIENT MESSAGE (OUTPATIENT)
Dept: GASTROENTEROLOGY | Facility: CLINIC | Age: 32
End: 2022-06-20

## 2022-06-20 RX ORDER — LINACLOTIDE 72 UG/1
1 CAPSULE, GELATIN COATED ORAL DAILY
Qty: 90 CAPSULE | Refills: 1 | Status: SHIPPED | OUTPATIENT
Start: 2022-06-20

## 2022-06-20 NOTE — TELEPHONE ENCOUNTER
From: April Caraballo  To: Roseline Mcdaniels MD  Sent: 6/20/2022 10:22 AM CDT  Subject: Linzess Side Effect    Hi Dr. Marya Kurtz,    I was wondering if it is possible to get a lower dose of Linzess? Since I began taking it, I have had diarrhea multiple times a day. I was hoping that once I adjusted to the medication, the side effects would lessen. However, that has not been the case. Thank you for your time and help!      Dacia Stoddard

## 2022-08-29 ENCOUNTER — LAB ENCOUNTER (OUTPATIENT)
Dept: LAB | Facility: HOSPITAL | Age: 32
End: 2022-08-29
Attending: INTERNAL MEDICINE
Payer: COMMERCIAL

## 2022-08-29 ENCOUNTER — TELEPHONE (OUTPATIENT)
Dept: GASTROENTEROLOGY | Facility: CLINIC | Age: 32
End: 2022-08-29

## 2022-08-29 ENCOUNTER — SURGERY CENTER DOCUMENTATION (OUTPATIENT)
Dept: SURGERY | Age: 32
End: 2022-08-29

## 2022-08-29 ENCOUNTER — LAB REQUISITION (OUTPATIENT)
Dept: SURGERY | Age: 32
End: 2022-08-29
Payer: COMMERCIAL

## 2022-08-29 DIAGNOSIS — Z32.01 POSITIVE URINE PREGNANCY TEST: Primary | ICD-10-CM

## 2022-08-29 DIAGNOSIS — K62.5 HEMORRHAGE OF RECTUM AND ANUS: ICD-10-CM

## 2022-08-29 DIAGNOSIS — K62.89 PROCTITIS: ICD-10-CM

## 2022-08-29 LAB — B-HCG SERPL-ACNC: <1 MIU/ML

## 2022-08-29 PROCEDURE — 84702 CHORIONIC GONADOTROPIN TEST: CPT

## 2022-08-29 PROCEDURE — 88305 TISSUE EXAM BY PATHOLOGIST: CPT | Performed by: INTERNAL MEDICINE

## 2022-08-29 PROCEDURE — 36415 COLL VENOUS BLD VENIPUNCTURE: CPT

## 2022-08-29 NOTE — TELEPHONE ENCOUNTER
Patient here for c-scope at [facility]. First urine pregnancy was positive, second urine pregnancy was negative. She denies any sexual activity recently. We sent her for serum beta HCG- which came back negative. No pregnancy detected. Proceed with c-scope.

## 2022-09-13 ENCOUNTER — TELEPHONE (OUTPATIENT)
Dept: GASTROENTEROLOGY | Facility: CLINIC | Age: 32
End: 2022-09-13

## 2022-09-13 ENCOUNTER — MED REC SCAN ONLY (OUTPATIENT)
Dept: GASTROENTEROLOGY | Facility: CLINIC | Age: 32
End: 2022-09-13

## 2022-09-13 RX ORDER — MESALAMINE 1000 MG/1
1000 SUPPOSITORY RECTAL NIGHTLY
Qty: 60 SUPPOSITORY | Refills: 0 | Status: SHIPPED | OUTPATIENT
Start: 2022-09-13 | End: 2022-09-16

## 2022-09-13 NOTE — TELEPHONE ENCOUNTER
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 Will try rectal meslaamine which she agrees, risks/bneefits addressed. Having some mild bleeding still      Message me after taking therapy for 6 weeks.

## 2022-09-15 ENCOUNTER — TELEPHONE (OUTPATIENT)
Dept: GASTROENTEROLOGY | Facility: CLINIC | Age: 32
End: 2022-09-15

## 2022-09-16 RX ORDER — MESALAMINE 1000 MG/1
1000 SUPPOSITORY RECTAL NIGHTLY
Qty: 90 SUPPOSITORY | Refills: 0 | Status: SHIPPED | OUTPATIENT
Start: 2022-09-16 | End: 2022-12-15

## 2023-01-20 ENCOUNTER — HOSPITAL ENCOUNTER (OUTPATIENT)
Age: 33
Discharge: HOME | End: 2023-01-20
Payer: COMMERCIAL

## 2023-01-20 ENCOUNTER — APPOINTMENT (OUTPATIENT)
Dept: CT IMAGING | Facility: HOSPITAL | Age: 33
End: 2023-01-20
Attending: EMERGENCY MEDICINE
Payer: COMMERCIAL

## 2023-01-20 ENCOUNTER — HOSPITAL ENCOUNTER (OUTPATIENT)
Age: 33
Discharge: ANOTHER HOSPITAL | End: 2023-01-20
Payer: COMMERCIAL

## 2023-01-20 ENCOUNTER — HOSPITAL ENCOUNTER (EMERGENCY)
Facility: HOSPITAL | Age: 33
Discharge: HOME | End: 2023-01-21
Attending: EMERGENCY MEDICINE
Payer: COMMERCIAL

## 2023-01-20 VITALS
DIASTOLIC BLOOD PRESSURE: 87 MMHG | SYSTOLIC BLOOD PRESSURE: 140 MMHG | TEMPERATURE: 98 F | HEART RATE: 82 BPM | RESPIRATION RATE: 18 BRPM | OXYGEN SATURATION: 99 %

## 2023-01-20 DIAGNOSIS — R10.9 ABDOMINAL PAIN, ACUTE: Primary | ICD-10-CM

## 2023-01-20 DIAGNOSIS — R10.31 RLQ ABDOMINAL PAIN: Primary | ICD-10-CM

## 2023-01-20 LAB
ALBUMIN SERPL-MCNC: 3.8 G/DL (ref 3.4–5)
ALBUMIN/GLOB SERPL: 1.1 {RATIO} (ref 1–2)
ALP LIVER SERPL-CCNC: 61 U/L
ALT SERPL-CCNC: 17 U/L
ANION GAP SERPL CALC-SCNC: 5 MMOL/L (ref 0–18)
AST SERPL-CCNC: 12 U/L (ref 15–37)
B-HCG UR QL: NEGATIVE
BASOPHILS # BLD AUTO: 0.05 X10(3) UL (ref 0–0.2)
BASOPHILS NFR BLD AUTO: 0.5 %
BILIRUB SERPL-MCNC: 0.3 MG/DL (ref 0.1–2)
BILIRUB UR QL STRIP: NEGATIVE
BUN BLD-MCNC: 17 MG/DL (ref 7–18)
BUN/CREAT SERPL: 25 (ref 10–20)
CALCIUM BLD-MCNC: 9.4 MG/DL (ref 8.5–10.1)
CHLORIDE SERPL-SCNC: 109 MMOL/L (ref 98–112)
CLARITY UR: CLEAR
CO2 SERPL-SCNC: 25 MMOL/L (ref 21–32)
COLOR UR: YELLOW
CREAT BLD-MCNC: 0.68 MG/DL
DEPRECATED RDW RBC AUTO: 39.1 FL (ref 35.1–46.3)
EOSINOPHIL # BLD AUTO: 0.33 X10(3) UL (ref 0–0.7)
EOSINOPHIL NFR BLD AUTO: 3 %
ERYTHROCYTE [DISTWIDTH] IN BLOOD BY AUTOMATED COUNT: 12.4 % (ref 11–15)
GFR SERPLBLD BASED ON 1.73 SQ M-ARVRAT: 118 ML/MIN/1.73M2 (ref 60–?)
GLOBULIN PLAS-MCNC: 3.5 G/DL (ref 2.8–4.4)
GLUCOSE BLD-MCNC: 92 MG/DL (ref 70–99)
GLUCOSE UR STRIP-MCNC: NEGATIVE MG/DL
HCT VFR BLD AUTO: 38.2 %
HGB BLD-MCNC: 12.7 G/DL
HGB UR QL STRIP: NEGATIVE
IMM GRANULOCYTES # BLD AUTO: 0.04 X10(3) UL (ref 0–1)
IMM GRANULOCYTES NFR BLD: 0.4 %
KETONES UR STRIP-MCNC: NEGATIVE MG/DL
LEUKOCYTE ESTERASE UR QL STRIP: NEGATIVE
LIPASE SERPL-CCNC: 84 U/L (ref 73–393)
LYMPHOCYTES # BLD AUTO: 3.89 X10(3) UL (ref 1–4)
LYMPHOCYTES NFR BLD AUTO: 35.7 %
MCH RBC QN AUTO: 28.7 PG (ref 26–34)
MCHC RBC AUTO-ENTMCNC: 33.2 G/DL (ref 31–37)
MCV RBC AUTO: 86.2 FL
MONOCYTES # BLD AUTO: 0.69 X10(3) UL (ref 0.1–1)
MONOCYTES NFR BLD AUTO: 6.3 %
NEUTROPHILS # BLD AUTO: 5.91 X10 (3) UL (ref 1.5–7.7)
NEUTROPHILS # BLD AUTO: 5.91 X10(3) UL (ref 1.5–7.7)
NEUTROPHILS NFR BLD AUTO: 54.1 %
NITRITE UR QL STRIP: NEGATIVE
OSMOLALITY SERPL CALC.SUM OF ELEC: 289 MOSM/KG (ref 275–295)
PH UR STRIP: 5.5 [PH]
PLATELET # BLD AUTO: 263 10(3)UL (ref 150–450)
POTASSIUM SERPL-SCNC: 4.2 MMOL/L (ref 3.5–5.1)
PROT SERPL-MCNC: 7.3 G/DL (ref 6.4–8.2)
PROT UR STRIP-MCNC: NEGATIVE MG/DL
RBC # BLD AUTO: 4.43 X10(6)UL
SODIUM SERPL-SCNC: 139 MMOL/L (ref 136–145)
SP GR UR STRIP: >=1.03
UROBILINOGEN UR STRIP-ACNC: <2 MG/DL
WBC # BLD AUTO: 10.9 X10(3) UL (ref 4–11)

## 2023-01-20 PROCEDURE — 80053 COMPREHEN METABOLIC PANEL: CPT

## 2023-01-20 PROCEDURE — 83690 ASSAY OF LIPASE: CPT | Performed by: EMERGENCY MEDICINE

## 2023-01-20 PROCEDURE — 85025 COMPLETE CBC W/AUTO DIFF WBC: CPT

## 2023-01-20 PROCEDURE — 99285 EMERGENCY DEPT VISIT HI MDM: CPT

## 2023-01-20 PROCEDURE — 99213 OFFICE O/P EST LOW 20 MIN: CPT

## 2023-01-20 PROCEDURE — 80053 COMPREHEN METABOLIC PANEL: CPT | Performed by: EMERGENCY MEDICINE

## 2023-01-20 PROCEDURE — 81025 URINE PREGNANCY TEST: CPT

## 2023-01-20 PROCEDURE — 83690 ASSAY OF LIPASE: CPT

## 2023-01-20 PROCEDURE — 85025 COMPLETE CBC W/AUTO DIFF WBC: CPT | Performed by: EMERGENCY MEDICINE

## 2023-01-20 PROCEDURE — 81002 URINALYSIS NONAUTO W/O SCOPE: CPT

## 2023-01-20 PROCEDURE — 74177 CT ABD & PELVIS W/CONTRAST: CPT | Performed by: EMERGENCY MEDICINE

## 2023-01-21 ENCOUNTER — APPOINTMENT (OUTPATIENT)
Dept: ULTRASOUND IMAGING | Facility: HOSPITAL | Age: 33
End: 2023-01-21
Attending: EMERGENCY MEDICINE
Payer: COMMERCIAL

## 2023-01-21 VITALS
SYSTOLIC BLOOD PRESSURE: 111 MMHG | BODY MASS INDEX: 28.7 KG/M2 | HEART RATE: 72 BPM | OXYGEN SATURATION: 96 % | HEIGHT: 63 IN | TEMPERATURE: 99 F | WEIGHT: 162 LBS | DIASTOLIC BLOOD PRESSURE: 80 MMHG | RESPIRATION RATE: 18 BRPM

## 2023-01-21 PROCEDURE — 93975 VASCULAR STUDY: CPT | Performed by: EMERGENCY MEDICINE

## 2023-01-21 PROCEDURE — 76856 US EXAM PELVIC COMPLETE: CPT | Performed by: EMERGENCY MEDICINE

## 2023-01-21 PROCEDURE — 76830 TRANSVAGINAL US NON-OB: CPT | Performed by: EMERGENCY MEDICINE

## 2023-01-21 PROCEDURE — 96360 HYDRATION IV INFUSION INIT: CPT

## 2023-01-21 NOTE — ED INITIAL ASSESSMENT (HPI)
Patient reports lower abdominal pain for the past week with symptoms worsening today. Reports hx of ovarian cysts. Denies nausea, vomiting, diarrhea, fevers. Concerned for uti.

## 2023-01-21 NOTE — ED INITIAL ASSESSMENT (HPI)
AOx4. Complaints of abd pain that started on left now moves to right side xs 3 days. Seen by IC and reported rebound tenderness on exam. -n/v/d/fevers.  LMP 1-11

## 2023-01-26 ENCOUNTER — OFFICE VISIT (OUTPATIENT)
Dept: INTERNAL MEDICINE CLINIC | Facility: CLINIC | Age: 33
End: 2023-01-26
Payer: COMMERCIAL

## 2023-01-26 VITALS
SYSTOLIC BLOOD PRESSURE: 120 MMHG | DIASTOLIC BLOOD PRESSURE: 60 MMHG | HEIGHT: 63 IN | BODY MASS INDEX: 28.35 KG/M2 | WEIGHT: 160 LBS

## 2023-01-26 DIAGNOSIS — R10.2 PELVIC PAIN: Primary | ICD-10-CM

## 2023-01-26 PROCEDURE — 3074F SYST BP LT 130 MM HG: CPT | Performed by: INTERNAL MEDICINE

## 2023-01-26 PROCEDURE — 99213 OFFICE O/P EST LOW 20 MIN: CPT | Performed by: INTERNAL MEDICINE

## 2023-01-26 PROCEDURE — 3008F BODY MASS INDEX DOCD: CPT | Performed by: INTERNAL MEDICINE

## 2023-01-26 PROCEDURE — 3078F DIAST BP <80 MM HG: CPT | Performed by: INTERNAL MEDICINE

## 2023-01-26 RX ORDER — NORGESTIMATE AND ETHINYL ESTRADIOL 0.25-0.035
1 KIT ORAL DAILY
Qty: 28 TABLET | Refills: 12 | Status: SHIPPED | OUTPATIENT
Start: 2023-01-26 | End: 2024-01-26

## 2023-06-27 ENCOUNTER — OFFICE VISIT (OUTPATIENT)
Dept: OBGYN CLINIC | Facility: CLINIC | Age: 33
End: 2023-06-27

## 2023-06-27 VITALS
HEART RATE: 73 BPM | DIASTOLIC BLOOD PRESSURE: 86 MMHG | HEIGHT: 63 IN | BODY MASS INDEX: 29.06 KG/M2 | WEIGHT: 164 LBS | SYSTOLIC BLOOD PRESSURE: 127 MMHG

## 2023-06-27 DIAGNOSIS — Z01.411 ENCOUNTER FOR GYNECOLOGICAL EXAMINATION (GENERAL) (ROUTINE) WITH ABNORMAL FINDINGS: Primary | ICD-10-CM

## 2023-06-27 DIAGNOSIS — R10.2 PELVIC PAIN IN FEMALE: ICD-10-CM

## 2023-06-27 PROCEDURE — 99385 PREV VISIT NEW AGE 18-39: CPT | Performed by: OBSTETRICS & GYNECOLOGY

## 2023-06-27 PROCEDURE — 3008F BODY MASS INDEX DOCD: CPT | Performed by: OBSTETRICS & GYNECOLOGY

## 2023-06-27 PROCEDURE — 99203 OFFICE O/P NEW LOW 30 MIN: CPT | Performed by: OBSTETRICS & GYNECOLOGY

## 2023-06-27 PROCEDURE — 3074F SYST BP LT 130 MM HG: CPT | Performed by: OBSTETRICS & GYNECOLOGY

## 2023-06-27 PROCEDURE — 3079F DIAST BP 80-89 MM HG: CPT | Performed by: OBSTETRICS & GYNECOLOGY

## 2023-06-28 LAB — HPV I/H RISK 1 DNA SPEC QL NAA+PROBE: NEGATIVE

## 2023-07-01 ENCOUNTER — TELEPHONE (OUTPATIENT)
Dept: OBGYN CLINIC | Facility: CLINIC | Age: 33
End: 2023-07-01

## 2023-07-01 NOTE — TELEPHONE ENCOUNTER
Pt is to have lupron 3.75mg monthly. LMP started today. Office does not have this lupron dose in stock and no other OB offices are open today. Discussed with RADHA on call. Per RADHA, ok for pt to get within the week of period starting. RADHA also wanted pt to be advised to use back up birth control as well. Pt informed of situation and advised we will call her next week to get pt in asap. Message to [employer], RN Supervisor.

## 2023-07-01 NOTE — TELEPHONE ENCOUNTER
Patient was told to call on the first day of her period to schedule a lupron shot. It started this morning. Please call.

## 2023-07-05 ENCOUNTER — TELEPHONE (OUTPATIENT)
Dept: OBGYN CLINIC | Facility: CLINIC | Age: 33
End: 2023-07-05

## 2023-07-05 NOTE — TELEPHONE ENCOUNTER
We did receive Lupron today. Schedule for injection tomorrow at 10am. Patient verbalized understanding.

## 2023-07-06 ENCOUNTER — NURSE ONLY (OUTPATIENT)
Dept: OBGYN CLINIC | Facility: CLINIC | Age: 33
End: 2023-07-06

## 2023-07-06 VITALS
SYSTOLIC BLOOD PRESSURE: 113 MMHG | WEIGHT: 166.63 LBS | HEART RATE: 70 BPM | DIASTOLIC BLOOD PRESSURE: 77 MMHG | BODY MASS INDEX: 30 KG/M2

## 2023-07-06 DIAGNOSIS — R10.2 PELVIC PAIN IN FEMALE: Primary | ICD-10-CM

## 2023-07-06 DIAGNOSIS — Z01.411 ENCOUNTER FOR GYNECOLOGICAL EXAMINATION (GENERAL) (ROUTINE) WITH ABNORMAL FINDINGS: ICD-10-CM

## 2023-07-06 PROCEDURE — 96372 THER/PROPH/DIAG INJ SC/IM: CPT | Performed by: OBSTETRICS & GYNECOLOGY

## 2023-07-06 PROCEDURE — 3074F SYST BP LT 130 MM HG: CPT | Performed by: OBSTETRICS & GYNECOLOGY

## 2023-07-06 PROCEDURE — 3078F DIAST BP <80 MM HG: CPT | Performed by: OBSTETRICS & GYNECOLOGY

## 2023-07-06 NOTE — PROGRESS NOTES
Pt here for Lupron 3.75 injection. VS obtained by MA. Pt verified, risk and benefits discussed and all questions answered to pts satisfaction. 5 rights of medication administration performed. Lupron 3.75 mg given IM in the right upper outer gluteal area. Pt tolerated injection well. Pt instructed to start Agestin Rx today and use condoms for back up protection during IC. Instructions to return in 1 month for next injection. Pt indicates she will make appt at  before she leaves. Discharged via ambulatory with belongings.

## 2023-07-14 NOTE — ED INITIAL ASSESSMENT (HPI)
Patient arrived ambulatory to room c/o left ear pressure. Patient states she has had some left sided dental pain, but was told there were no issues per the dentist and to have her ear checked. No nasal congestion.  No fevers

## 2023-08-04 NOTE — PROGRESS NOTES
Pt in for Lupron injection 3.75, received injection on left upper outer gluteus, pt tolerated well. Next dose to be given 9/5, reminded pt to schedule medication f/u appointment with ROBIN for the end of Oct, pt verbalized understanding.  Last annual 6/27/23 with [facility]

## 2023-09-05 NOTE — PROGRESS NOTES
Lupron 3.75mg administered to pts right upper outer gluetus. pt tolerated injection well. pt advised to return in 1 month for next lupron injection. F/u with [facility] after 4th injection .

## 2023-09-06 NOTE — TELEPHONE ENCOUNTER
A refill request was received for:  Requested Prescriptions     Pending Prescriptions Disp Refills    sertraline (ZOLOFT) 100 MG Oral Tab 60 tablet 11     Si tablets  200mg daily     Last refill date:  19    Last office visit: 23      Future Appointments   Date Time Provider Lyndsey Michelle   10/5/2023  3:30 PM  GYNE INJECTION ROOM [facility]   10/27/2023  9:40 AM Jan Hitchcock MD [facility]

## 2023-10-05 NOTE — PROGRESS NOTES
Patient is here today for Lupron injection #4 (3.75 mg). Lupron administered on right gluteus, patient tolerated well. Patient to come in for a f/u visit with [address] [place] [address] on 10-27-23. Patient verbalized understanding.

## 2023-11-03 NOTE — PROGRESS NOTES
PATIENT CAME IN TODAY FOR LUPRON 3.75 MG #5 TODAY, PATIENT RECEIVED INJECTION ON RIGHT UPPER GLUTEUS, PATIENT TOLERATED INJECTION WELL, PATIENT TO COME BACK FOR LUPRON 3.75 MG  #6 AROUND 12/4/23, PATIENT STATED ALREADY HAD SCHEDULE FOR NEXT INJECTION,

## 2023-11-30 NOTE — PROGRESS NOTES
Pt here for #6 Lupron injection. Pt verified, VS obtained. 5 rights of medication administration. Lupron 3.75 mg given IM in the right upper outer gluteal area. Pt tolerated injection well. Has f/u appt with ROBIN on 12/21/2023. Pt discharged via ambulatory with belongings.

## 2023-12-27 NOTE — TELEPHONE ENCOUNTER
From: Carlyle Resendiz  To: Antonio Day  Sent: 2023 5:53 PM CST  Subject: Birth Rohwer Shouts Dr. Vamshi Rivers,    I cannot be on the birth control. It affects my mood/mind too much. I am going to stop taking it for my mental health. I am not sure if there are other options. If not, then I will just deal with the physical pain. It is a lot easier than the mental side effects.      Heywood Apgar

## 2023-12-27 NOTE — TELEPHONE ENCOUNTER
Pt stated she is interested in trying Slynd. OK to send enough refills to cover pt until due for f/u annual in June?

## 2024-04-25 NOTE — ED PROVIDER NOTES
Patient Seen in: [facility]      History     Chief Complaint   Patient presents with    Sore Throat     Stated Complaint: Strep?Not feeling well  Subjective:   33-year-old female presents for a sore throat and decreased appetite for the past 3 days.  Last time she felt like this, she had strep throat.  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  No difficulty swallowing.  Speech is clear.  No fevers or chills.  She is tolerating fluids without any vomiting, but she does have nausea.  No diarrhea.  No abdominal pain.  No urinary symptoms.  No neck stiffness.  No rashes.  No headaches.  No dizziness.  She appears nontoxic.      Objective:   Past Medical History:    Autoimmune disorder (HCC)    High ISMAEL Levels    CHICKEN POX    Constipation    Depression    LEAH (generalized anxiety disorder)    IBS (irritable bowel syndrome)    Reason for Referral    Seasonal allergies            History reviewed. No pertinent surgical history.           Social History     Socioeconomic History    Marital status:    Tobacco Use    Smoking status: Never    Smokeless tobacco: Never   Vaping Use    Vaping status: Never Used   Substance and Sexual Activity    Alcohol use: Not Currently     Alcohol/week: 0.0 standard drinks of alcohol     Comment: wine occ. rarely maybe once eveyr 6 months    Drug use: No    Sexual activity: Yes     Partners: Male   Social History Narrative    ** Merged History Encounter **                 Review of Systems    Positive for stated complaint: Strep?Not feeling well  Other systems are as noted in HPI.  Constitutional and vital signs reviewed.      All other systems reviewed and negative except as noted above.    Physical Exam     ED Triage Vitals [04/25/24 1823]   /81   Pulse 78   Resp 18   Temp 97.9 °F (36.6 °C)   Temp src    SpO2 98 %   O2 Device None (Room air)     Current:/81   Pulse 78   Temp 97.9 °F (36.6 °C)   Resp 18   LMP 04/14/2024 (Exact  Date)   SpO2 98%     Physical Exam  Vitals and nursing note reviewed.   Constitutional:       General: She is not in acute distress.     Appearance: She is well-developed. She is not toxic-appearing.   HENT:      Head: Normocephalic.      Right Ear: Tympanic membrane normal.      Left Ear: Tympanic membrane normal.      Nose: No congestion or rhinorrhea.      Mouth/Throat:      Mouth: Mucous membranes are moist. No oral lesions.      Pharynx: Oropharynx is clear. Uvula midline. Posterior oropharyngeal erythema present. No pharyngeal swelling, oropharyngeal exudate or uvula swelling.      Tonsils: No tonsillar exudate or tonsillar abscesses.   Eyes:      Conjunctiva/sclera: Conjunctivae normal.   Cardiovascular:      Rate and Rhythm: Normal rate and regular rhythm.   Pulmonary:      Effort: Pulmonary effort is normal.      Breath sounds: Normal breath sounds.   Abdominal:      General: Bowel sounds are normal.      Palpations: Abdomen is soft.      Tenderness: There is no abdominal tenderness.   Musculoskeletal:      Cervical back: Normal range of motion and neck supple.   Lymphadenopathy:      Cervical: No cervical adenopathy.   Skin:     General: Skin is warm and dry.      Capillary Refill: Capillary refill takes less than 2 seconds.      Findings: No rash.   Neurological:      General: No focal deficit present.      Mental Status: She is alert and oriented to person, place, and time.   Psychiatric:         Mood and Affect: Mood normal.         Behavior: Behavior normal.         ED Course   No results found.  Labs Reviewed   POCT PREGNANCY URINE - Normal   RAPID STREP A - Normal   [facility] POCT URINALYSIS DIPSTICK         MDM     Medical Decision Making  The urine dip and UCG are negative.  The rapid strep test is negative.  Her symptoms are most likely viral.  We discussed supportive care including Tylenol or Motrin as needed for pain or fever, pushing fluids, salt water gargles, and rest.  She will follow-up as  needed with her primary care doctor if her symptoms persist.    Amount and/or Complexity of Data Reviewed  Labs: ordered.     Details: The UCG is negative.  The urine dip is negative.  The rapid strep test is negative.    Risk  OTC drugs.  Risk Details: Viral pharyngitis versus strep throat.        Disposition and Plan     Clinical Impression:  1. Viral pharyngitis         Disposition:  Discharge  4/25/2024  6:50 pm    Follow-up:  Karen Kwok MD  [address] [place] [id number]  [phone]    Schedule an appointment as soon as possible for a visit   As needed          Medications Prescribed:  Discharge Medication List as of 4/25/2024  6:51 PM

## 2024-04-25 NOTE — DISCHARGE INSTRUCTIONS
Push fluids.  Rest.  Tylenol or ibuprofen as needed for pain or fever.  Follow-up as needed with your doctor as needed.  Return for any concerns.

## 2024-04-25 NOTE — ED INITIAL ASSESSMENT (HPI)
Patient arrived ambulatory to room c/o symptoms that started 3 days ago. +sore throat. No nasal congestion. No cough. No fevers. No v/d. +decreased appetite. 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

## 2024-06-12 NOTE — ED PROVIDER NOTES
Patient Seen in: [facility]      History     Chief Complaint   Patient presents with    Ear Problem Pain     Stated Complaint: ear pain    Subjective:   HPI    This is a 34-year-old female presenting with ear pressure.  Patient states that she was seen at another immediate care center on 6/4/2024 for a bump that was inside of her ear.  Patient states they drained it and put her on antibiotics that she took for 7 days and finish.  Patient states she just feels like she has right ear pressure and wanted to be evaluated.  Denies ear pain or drainage from the ear denies fever outer ear swelling redness or warmth.    Objective:   Past Medical History:    Autoimmune disorder (HCC)    High ISMAEL Levels    CHICKEN POX    Constipation    Depression    LEAH (generalized anxiety disorder)    IBS (irritable bowel syndrome)    Reason for Referral    Seasonal allergies              History reviewed. No pertinent surgical history.             Social History     Socioeconomic History    Marital status:    Tobacco Use    Smoking status: Never    Smokeless tobacco: Never   Vaping Use    Vaping status: Never Used   Substance and Sexual Activity    Alcohol use: Not Currently     Alcohol/week: 0.0 standard drinks of alcohol     Comment: wine occ. rarely maybe once eveyr 6 months    Drug use: No    Sexual activity: Yes     Partners: Male   Social History Narrative    ** Merged History Encounter **                   Review of Systems    Positive for stated complaint: ear pain  Other systems are as noted in HPI.  Constitutional and vital signs reviewed.      All other systems reviewed and negative except as noted above.    Physical Exam     ED Triage Vitals [06/12/24 1457]   /90   Pulse 82   Resp 16   Temp 97.3 °F (36.3 °C)   Temp src Temporal   SpO2 99 %   O2 Device None (Room air)       Current Vitals:   Vital Signs  BP: 127/90  Pulse: 82  Resp: 16  Temp: 97.3 °F (36.3 °C)  Temp src: Temporal    Oxygen  Therapy  SpO2: 99 %  O2 Device: None (Room air)            Physical Exam  Vitals and nursing note reviewed.   Constitutional:       Appearance: Normal appearance.   HENT:      Right Ear: Tympanic membrane normal. No mastoid tenderness. Tympanic membrane is not erythematous or bulging.      Left Ear: Tympanic membrane normal.      Ears:        Nose: Nose normal.      Mouth/Throat:      Mouth: Mucous membranes are moist.      Pharynx: Oropharynx is clear.   Eyes:      Conjunctiva/sclera: Conjunctivae normal.   Musculoskeletal:         General: Normal range of motion.      Cervical back: Normal range of motion.   Skin:     General: Skin is warm and dry.      Capillary Refill: Capillary refill takes less than 2 seconds.   Neurological:      General: No focal deficit present.      Mental Status: She is alert and oriented to person, place, and time.   Psychiatric:         Mood and Affect: Mood normal.               ED Course   Labs Reviewed - No data to display         MDM               Medical Decision Making  34-year-old female well-appearing and nontoxic presenting for right ear pressure.  DDx cyst of the ear canal versus abscess versus OM versus OE versus otalgia versus cerumen impaction.  No signs and symptoms of infection patient does have a lesion at the entrance of the ear canal that likely is a cyst.  Discussed possible diagnoses with the patient.  Discussed with the patient no signs and symptoms of infection and recommended following up with ears nose and throat specialist.  All education instructions recommendations ER precautions placed in discharge paperwork.  Patient acknowledged understanding discharge instructions.    Problems Addressed:  Cyst of ear canal: acute illness or injury  Ear pressure, right: acute illness or injury    Risk  OTC drugs.        Disposition and Plan     Clinical Impression:  1. Ear pressure, right    2. Cyst of ear canal         Disposition:  Discharge  6/12/2024  3:00  pm    Follow-up:  Nigel Harrison MD  [address] [place] [id number]  [phone]    Call   Resource for ears nose and throat specialist          Medications Prescribed:  Discharge Medication List as of 6/12/2024  3:01 PM                                          Render In Strict Bullet Format?: No

## 2024-06-12 NOTE — DISCHARGE INSTRUCTIONS
Call to set up an appointment with the ears nose and throat specialist.  If you have pain you may take ibuprofen or Tylenol.  If you develop outer ear swelling redness or warmth fevers or chills drainage from the ear severe ear pain go to the nearest emergency department.

## 2024-06-13 NOTE — PROGRESS NOTES
Ninoska Peters is a 34 year old female.   Chief Complaint   Patient presents with    New Patient    Ear Problem     Patient reports right ear pressure and pain, was treated with medication and improvement.    Throat Problem     Patient reports pain on back side of throat.       ASSESSMENT AND PLAN:   1. Sore throat  34-year-old presents with soreness on the right side of her throat.  She says that she had an abscess or cyst in the entrance of her right ear canal that was drained in urgent care    On exam she has prominent posterior pharyngeal erythema along the lateral aspects of her pharynx.  No signs of purulence or any tonsillitis.  Her right ear appears to be resolved there is no tenderness or fluctuance or erythema of her ear canal or otitis media present    Appears her ear issue is resolved clinically.  She does have prominent erythema of her pharynx along the lateral aspects possibly indicating a postnasal drip or allergy issue.  Do not see any signs of an infection today to more antibiotics.  She is going to try an over-the-counter oral antihistamine and also suggested Flonase.  Currently observe this issue with these measures.  Consult from Dr. Gerardo regarding ear and throat evaluation    2. Pharyngitis, chronic        The patient indicates understanding of these issues and agrees to the plan.      EXAM:   Ht 5' 3\" (1.6 m)   Wt 178 lb (80.7 kg)   LMP 04/14/2024 (Exact Date)   BMI 31.53 kg/m²     Pertinent exam findings may also be noted above in assessment and plan     System Details   Skin Inspection - Normal.   Constitutional Overall appearance - Normal.   Head/Face Symmetric, TMJ tenderness not present    Eyes EOMI, PERRL   Right ear:  Canal clear, TM intact, no KAREN   Left ear:  Canal clear, TM intact, no KAREN   Nose: Septum midline, inferior turbinates not enlarged, nasal valves without collapse    Oral cavity/Oropharynx: No lesions or masses on inspection or palpation, tonsils symmetric     Neck: Soft without LAD, thyroid not enlarged  Voice clear/ no stridor   Other:      Scopes and Procedures:             Current Outpatient Medications   Medication Sig Dispense Refill    Norethindrone (DOUGLAS) 0.35 MG Oral Tab Take 1 tablet (0.35 mg total) by mouth daily. 90 tablet 1    sertraline (ZOLOFT) 100 MG Oral Tab 2 tablets  200mg daily 60 tablet 11    acetaminophen-codeine 300-30 MG Oral Tab Take 1 tablet by mouth every 4 to 6 hours as needed for Pain. (Patient not taking: Reported on 6/13/2024)      cefdinir 300 MG Oral Cap  (Patient not taking: Reported on 6/13/2024)      chlorhexidine gluconate 0.12 % Mouth/Throat Solution  (Patient not taking: Reported on 6/13/2024)      ALPRAZolam 0.25 MG Oral Tab Take 1 tablet (0.25 mg total) by mouth every 6 (six) hours as needed. (Patient not taking: Reported on 6/13/2024) 30 tablet 1    linaCLOtide (LINZESS) 72 MCG Oral Cap Take 1 capsule by mouth daily. (Patient not taking: Reported on 6/13/2024) 90 capsule 1    Dicyclomine HCl 10 MG Oral Cap Take 1 capsule (10 mg total) by mouth 2 (two) times daily as needed. (Patient not taking: Reported on 6/13/2024) 60 capsule 1      Past Medical History:    Autoimmune disorder (HCC)    High ISMAEL Levels    CHICKEN POX    Constipation    Depression    LEAH (generalized anxiety disorder)    IBS (irritable bowel syndrome)    Reason for Referral    Seasonal allergies      Social History:  Social History     Socioeconomic History    Marital status:    Tobacco Use    Smoking status: Never    Smokeless tobacco: Never   Vaping Use    Vaping status: Never Used   Substance and Sexual Activity    Alcohol use: Not Currently     Alcohol/week: 0.0 standard drinks of alcohol     Comment: wine occ. rarely maybe once eveyr 6 months    Drug use: No    Sexual activity: Yes     Partners: Male   Social History Narrative    ** Merged History Encounter **               Nigel Harrison MD  6/13/2024  2:06 PM

## 2024-08-19 NOTE — TELEPHONE ENCOUNTER
Received a fax request for Medical Records from:    [facility]  [address] [place] [address]  Donna Sung. [phone]    Faxed to Medical Records and received confirmation.    Will send Medical Records via

## 2024-11-05 NOTE — PROGRESS NOTES
Subjective:   Ninoska Peters is a 34 year old female who presents for Physical     Patient here for a wellness examination and fu on Santiago - is doing well with Sertraline rx. Has no physical symptoms except for occasional cold feeling in the tips of fingers .  History/Other:    Chief Complaint Reviewed and Verified  No Further Nursing Notes to   Review  Problem List Reviewed         Tobacco:  She has never smoked tobacco.    Current Outpatient Medications   Medication Sig Dispense Refill    ALPRAZolam 0.25 MG Oral Tab Take 1 tablet (0.25 mg total) by mouth every 6 (six) hours as needed. 30 tablet 1    sertraline (ZOLOFT) 100 MG Oral Tab 2 tablets  200mg daily 60 tablet 11         Review of Systems:  Review of Systems   Constitutional: Negative.    HENT: Negative.     Eyes: Negative.    Respiratory: Negative.     Cardiovascular: Negative.    Gastrointestinal: Negative.    Endocrine: Negative.    Genitourinary: Negative.    Musculoskeletal: Negative.    Allergic/Immunologic: Negative.    Neurological: Negative.    Psychiatric/Behavioral:  Negative for dysphoric mood. The patient is nervous/anxious.          Objective:   /80   Pulse 76   Ht 5' 3\" (1.6 m)   Wt 179 lb (81.2 kg)   LMP 04/14/2024 (Exact Date)   SpO2 96%   BMI 31.71 kg/m²  Estimated body mass index is 31.71 kg/m² as calculated from the following:    Height as of this encounter: 5' 3\" (1.6 m).    Weight as of this encounter: 179 lb (81.2 kg).  Physical Exam  Constitutional:       Appearance: Normal appearance.      Comments: overweight   HENT:      Head: Normocephalic and atraumatic.      Right Ear: Ear canal normal.      Left Ear: Ear canal normal.   Eyes:      General: No scleral icterus.     Pupils: Pupils are equal, round, and reactive to light.   Neck:      Vascular: No carotid bruit.   Cardiovascular:      Rate and Rhythm: Normal rate and regular rhythm.      Heart sounds: No murmur heard.  Pulmonary:      Effort: Pulmonary effort  is normal.      Breath sounds: Normal breath sounds.   Abdominal:      Palpations: Abdomen is soft. There is no mass.      Tenderness: There is no abdominal tenderness.   Musculoskeletal:      Cervical back: Neck supple.      Right lower leg: No edema.      Left lower leg: No edema.   Lymphadenopathy:      Cervical: No cervical adenopathy.   Skin:     Findings: No lesion.   Neurological:      Mental Status: She is alert. Mental status is at baseline.   Psychiatric:         Thought Content: Thought content normal.           Assessment & Plan:   1. Wellness examination (Primary)  check fasting labs  2. LEAH (generalized anxiety disorder) controlled well with Zoloft and rare Alprazolam prn        No follow-ups on file.    Karen Kwok MD, 11/5/2024, 10:13 AM

## 2024-11-15 NOTE — TELEPHONE ENCOUNTER
Pt got refill order for Sertraline but [facility] contacted her that they need clarification on dosage for medication as it was changed  sertraline 100 MG Oral Tab       Ref# [id number]

## 2025-03-17 NOTE — ED INITIAL ASSESSMENT (HPI)
Pt c/o sore throat x3 days.  States son with strep last week.  No fever.  No cough. No runny nose.     0

## 2025-03-17 NOTE — DISCHARGE INSTRUCTIONS
-REFER TO HANDOUT FOR FURTHER DISCHARGE CARE.  -TAKE MEDICATIONS AS PRESCRIBED.    -Take Tylenol or ibuprofen to relieve throat pain and reduce fever.  -Salt water gargles, herbal teas, frozen desserts, over the counter throat sprays and throat  lozenges may reduce pain with swallowing.  -Using a humidifier may help with breathing and swallowing.  -Practice good hand washing, no sharing spoons/forks, no sharing cups/glasses, no kissing- to  prevent spreading your symptoms to others.  -Throw away old toothbrush.  -Please return to the Emergency Room if you experience inability to swallow your own  secretions, if your voice becomes muffled, inability to swallow your medications, trouble  breathing, worsening throat or neck pain, increased swelling, neck stiffness, or if your fever is  not relieved by Tylenol/ Motrin

## (undated) NOTE — LETTER
Date & Time: 7/13/2019, 5:29 AM  Patient: Ro Favors  Encounter Provider(s):    Nilda Madsen MD         This certifies that Stephanie Bartholomew, a patient at an [facility], am leaving the facility voluntarily an

## (undated) NOTE — ED AVS SNAPSHOT
[facility] in [address] [address] Eulogio Condon    Phone:  [phone]    Fax:  [phone]           Maryam Greenberg   MRN: [id number]    Department:  [facility] in [address] [place] [address]   Date of Visit benefit level being available to you or other limited reimbursement. The physician may seek payment directly from you for amounts other than your deductible, co-payment, or co-insurance and for other services not covered under your health insurance plan. If you believe that any of the medications or instructions on this list is different from what your Primary Care doctor has instructed you - please continue to take your medications as instructed by your Primary Care doctor until you can check with your do can help with your Affordable Care Act coverage, as well as all types of Medicaid plans. To get signed up and covered, please call [phone] and ask to get set up for an insurance coverage that is in-network with Jerrica Vega

## (undated) NOTE — MR AVS SNAPSHOT
[address] [facility] [address] [facility] [phone]  [phone]               Thank you for choosing us for your health care visit with Keli March MD.  We are glad to serve you and happy to provide you with this You can access your MyChart to more actively manage your health care and view more details from this visit by going to [email or web address].   If you've recently had a stay at the Hospital you can access your discharge instructions in [address] by roberto

## (undated) NOTE — MR AVS SNAPSHOT
[address] [facility] [address] [facility] [id number] [phone]               Thank you for choosing us for your health care visit with Ronald Sykes DO.   We are glad to serve you and happy to provide you with this daley Nitrofurantoin Monohyd Macro 100 MG Caps   TK 1 C PO BID   Commonly known as:  MACROBID           Sertraline HCl 100 MG Tabs   TK 2 TS PO QD   Commonly known as:  ZOLOFT           * Notice:   This list has 2 medication(s) that are the same as other medicat

## (undated) NOTE — ED AVS SNAPSHOT
Josse Elias   MRN: [id number]    Department:  [facility]   Date of Visit:  7/23/2018           Disclosure     Insurance plans vary and the physician(s) referred by the ER may not be covered by your plan.  Please cont CARE PHYSICIAN AT ONCE OR RETURN IMMEDIATELY TO THE EMERGENCY DEPARTMENT. If you have been prescribed any medication(s), please fill your prescription right away and begin taking the medication(s) as directed.   If you believe that any of the medications

## (undated) NOTE — ED AVS SNAPSHOT
Nader Pacheco   MRN: [id number]    Department:  [facility] Emergency Department   Date of Visit:  7/13/2019           Disclosure     Insurance plans vary and the physician(s) referred by the ER may not be covered by your plan.  Please cont CARE PHYSICIAN AT ONCE OR RETURN IMMEDIATELY TO THE EMERGENCY DEPARTMENT. If you have been prescribed any medication(s), please fill your prescription right away and begin taking the medication(s) as directed.   If you believe that any of the medications